# Patient Record
Sex: MALE | Race: WHITE | Employment: OTHER | URBAN - NONMETROPOLITAN AREA
[De-identification: names, ages, dates, MRNs, and addresses within clinical notes are randomized per-mention and may not be internally consistent; named-entity substitution may affect disease eponyms.]

---

## 2017-08-02 DIAGNOSIS — E78.2 MIXED HYPERLIPIDEMIA: ICD-10-CM

## 2017-08-02 NOTE — TELEPHONE ENCOUNTER
simvastatin (ZOCOR) 40 MG tablet  Last Written Prescription Date:6/9/16  Last Fill Quantity: 90, # refills: 3  Last Office Visit with [facility], [facility] or [facility] prescribing provider: 8/5/16  Next 5 appointments (look out 90 days)     Aug 18, 2017 10:20 AM CDT   PHYSICAL with Roland Kearns MD   [facility] ([facility])    [address] [facility] [id number] [phone]                   Lab Results   Component Value Date    CHOL 193 06/09/2016     Lab Results   Component Value Date    HDL 45 06/09/2016     Lab Results   Component Value Date     06/09/2016     Lab Results   Component Value Date    TRIG 124 06/09/2016     Lab Results   Component Value Date    CHOLHDLRATIO 3.5 04/01/2015

## 2017-08-03 RX ORDER — SIMVASTATIN 40 MG
TABLET ORAL
Qty: 90 TABLET | Refills: 0 | Status: SHIPPED | OUTPATIENT
Start: 2017-08-03 | End: 2017-11-27

## 2017-08-03 NOTE — TELEPHONE ENCOUNTER
Routing refill request to provider for review/approval because:  Labs out of range:  FLP  Labs not current:  SKIP Doll RN  [facility]

## 2017-08-18 ENCOUNTER — OFFICE VISIT (OUTPATIENT)
Dept: FAMILY MEDICINE | Facility: OTHER | Age: 67
End: 2017-08-18
Payer: MEDICARE

## 2017-08-18 VITALS
HEIGHT: 72 IN | TEMPERATURE: 97.5 F | OXYGEN SATURATION: 99 % | WEIGHT: 207.6 LBS | DIASTOLIC BLOOD PRESSURE: 80 MMHG | HEART RATE: 101 BPM | RESPIRATION RATE: 18 BRPM | BODY MASS INDEX: 28.12 KG/M2 | SYSTOLIC BLOOD PRESSURE: 102 MMHG

## 2017-08-18 DIAGNOSIS — E78.5 HYPERLIPIDEMIA LDL GOAL <130: ICD-10-CM

## 2017-08-18 DIAGNOSIS — Z00.00 MEDICARE ANNUAL WELLNESS VISIT, INITIAL: Primary | ICD-10-CM

## 2017-08-18 DIAGNOSIS — Z11.59 ENCOUNTER FOR HEPATITIS C SCREENING TEST FOR LOW RISK PATIENT: ICD-10-CM

## 2017-08-18 DIAGNOSIS — M72.2 PLANTAR FASCIITIS: ICD-10-CM

## 2017-08-18 PROCEDURE — G0402 INITIAL PREVENTIVE EXAM: HCPCS | Performed by: FAMILY MEDICINE

## 2017-08-18 ASSESSMENT — PAIN SCALES - GENERAL: PAINLEVEL: SEVERE PAIN (6)

## 2017-08-18 NOTE — MR AVS SNAPSHOT
After Visit Summary   8/18/2017    Dominic Silva    MRN: [id number]           Patient Information     Date Of Birth          1950        Visit Information        Provider Department      8/18/2017 10:20 AM Roland Kearns MD [facility]        Today's Diagnoses     Medicare annual wellness visit, initial    -  1    Hyperlipidemia LDL goal <130        Encounter for hepatitis C screening test for low risk patient        Plantar fasciitis          Care Instructions      Preventive Health Recommendations:       Male Ages 65 and over    Yearly exam:             See your health care provider every year in order to  o   Review health changes.   o   Discuss preventive care.    o   Review your medicines if your doctor has prescribed any.    Talk with your health care provider about whether you should have a test to screen for prostate cancer (PSA).    Every 3 years, have a diabetes test (fasting glucose). If you are at risk for diabetes, you should have this test more often.    Every 5 years, have a cholesterol test. Have this test more often if you are at risk for high cholesterol or heart disease.     Every 10 years, have a colonoscopy. Or, have a yearly FIT test (stool test). These exams will check for colon cancer.    Talk to with your health care provider about screening for Abdominal Aortic Aneurysm if you have a family history of AAA or have a history of smoking.  Shots:     Get a flu shot each year.     Get a tetanus shot every 10 years.     Talk to your doctor about your pneumonia vaccines. There are now two you should receive - Pneumovax (PPSV 23) and Prevnar (PCV 13).    Talk to your doctor about a shingles vaccine.     Talk to your doctor about the hepatitis B vaccine.  Nutrition:     Eat at least 5 servings of fruits and vegetables each day.     Eat whole-grain bread, whole-wheat pasta and brown rice instead of white grains and rice.     Talk to your doctor about Calcium and  Vitamin D.   Lifestyle    Exercise for at least 150 minutes a week (30 minutes a day, 5 days a week). This will help you control your weight and prevent disease.     Limit alcohol to one drink per day.     No smoking.     Wear sunscreen to prevent skin cancer.     See your dentist every six months for an exam and cleaning.   See your eye doctor every 1 to 2 years to screen for conditions such as glaucoma, macular degeneration and cataracts.  Plantar Fasciitis  Plantar fasciitis is a painful swelling of the plantar fascia. The plantar fascia is a thick, fibrous layer of tissue. It covers the bones on the bottom of your foot. And it supports the foot bones in an arched position.  This can happen gradually or suddenly. It usually affects one foot at a time. Heel pain can be sharp, like a knife sticking into the bottom of your foot. You may feel pain after exercising, long-distance jogging, stair climbing, long periods of standing, or after standing up.  Risk factors include: non-active lifestyle, arthritis, diabetes, obesity or recent weight gain, flat foot, high arch. Wearing high heels, loose shoes, or shoes with poor arch support for long periods of time adds to the risk. This problem is commonly found in runners and dancers. It also found in people who stand on hard surfaces for long periods of time.  Foot pain from this condition is usually worse in the morning. But it improves with walking. By the end of the day there may be a dull aching. Treatment requires short-term rest and controlling swelling. It may take up to 9 months before all symptoms go away. Rarely, a steroid injection into the foot, or surgery, may be needed.  Home care  If you are overweight, lose weight to help healing.  Choose supportive shoes with good arch support and shock absorbency. Replace athletic shoes when they become worn out. Don t walk or run barefoot.  Premade or custom-fitted shoe inserts may be helpful. Inserts made of silicone  seem to be the most effective. Custom-made inserts can be provided by a podiatrist or foot specialist, physical therapist, or orthopedist.  Premade or custom-made night splints keep the heel stretched out while you sleep. They may prevent morning pain.  Avoid activities that stress the feet: jogging, prolonged standing or walking, contact sports, etc.  First thing in the morning and before sports, stretch the bottom of your feet. Gently flex your ankle so the toes move toward your knee.  Icing may help control heel pain. Apply an ice pack to the heel for 10-20 minutes as a preventive. Or ice your heel after a severe flare-up of symptoms. You may repeat this every 1-2 hours as needed.  You may use over-the-counter pain medicine to control pain, unless another medicine was prescribed. Anti-inflammatory pain medicines, such as ibuprofen or naproxen, may work better than acetaminophen. If you have chronic liver or kidney disease or ever had a stomach ulcer or GI bleeding, talk with your healthcare provider before using these medicines.  Follow-up care  Follow up with your healthcare provider, physical therapist, or podiatrist or foot specialist as advised.  Call for an appointment if pain worsens or there is no relief after a few weeks of home treatment. Shoe inserts, a night splint, or a special boot may be required.  If X-rays were taken, you will be told of any new findings that may affect your care.  When to seek medical advice  Call your healthcare provider right away if any of these occur:  Foot swelling  Redness with increasing pain  Date Last Reviewed: 11/21/2015 2000-2017 [employer]. [address] [place] [address], [place] [address]. All rights reserved. This information is not intended as a substitute for professional medical care. Always follow your healthcare professional's instructions.        Treating Plantar Fasciitis  First, your healthcare provider tries to determine the cause of your problem  in order to suggest ways to relieve pain. If your pain is due to poor foot mechanics, custom-made shoe inserts (orthoses) may help.    Reduce symptoms  To relieve mild symptoms, try aspirin, ibuprofen, or other medicines as directed. Rubbing ice on the affected area may also help.  To reduce severe pain and swelling, your healthcare provider may prescribe pills or injections or a walking cast in some instances. Physical therapy, such as ultrasound or a daily stretching program, may also be recommended. Surgery is rarely required.  To reduce symptoms caused by poor foot mechanics, your foot may be taped. This supports the arch and temporarily controls movement. Night splints may also help by stretching the fascia.  Control movement  If taping helps, your healthcare provider may prescribe orthoses. Built from plaster casts of your feet, these inserts control the way your foot moves. As a result, your symptoms should go away.  Reduce overuse  Every time your foot strikes the ground, the plantar fascia is stretched. You can reduce the strain on the plantar fascia and the possibility of overuse by following these suggestions:  Lose any excess weight.  Avoid running on hard or uneven ground.  Use orthoses at all times in your shoes and house slippers.  If surgery is needed  Your healthcare provider may consider surgery if other types of treatment don't control your pain. During surgery, the plantar fascia is partially cut to release tension. As you heal, fibrous tissue fills the space between the heel bone and the plantar fascia.   Date Last Reviewed: 10/14/2015    3300-5955 [employer]. [address] [place] [address], [place] [address]. All rights reserved. This information is not intended as a substitute for professional medical care. Always follow your healthcare professional's instructions.        Understanding Plantar Fasciitis    Plantar fasciitis is a condition that causes foot and heel pain. The plantar  fascia is a tough band of tissue that runs across the bottom of the foot from the heel to the toes. This tissue pulls on the heel bone. It supports the arch of the foot as it pushes off the ground. If the tissue becomes irritated or red and swollen (inflamed), it is called plantar fasciitis.  How to say it  PLAN-tuhr fa-see-IY-tis   What causes plantar fasciitis?  Plantar fasciitis most often occurs from overusing the plantar fascia. The tissue may become damaged from activities that put repeated stress on the heel and foot. Or it may wear down over time with age and ankle stiffness. You are more likely to have plantar fasciitis if you:  Do activities that require a lot of running, jumping, or dancing  Have a job that requires being on your feet for long periods  Are overweight or obese  Have certain foot problems, such as a tight Achilles tendon, flat feet, or high arches  Often wear poorly fitting shoes  Symptoms of plantar fasciitis  The condition most often causes pain in the heel and the bottom of the foot. The pain may occur when you take your first steps in the morning. It may get better as you walk throughout the day. But as you continue to put weight on the foot, the pain often returns. Pain may also occur after standing or sitting for long periods.  Treating plantar fasciitis  Treatments for plantar fasciitis include:  Resting the foot. This involves limiting movements that make your foot hurt. You may also need to avoid certain sports and types of work for a time.  Using cold packs. Put an ice pack on the heel and foot to help reduce pain and swelling.  Taking pain medicines. Prescription and over-the-counter pain medicines can help relieve pain and swelling.  Using heel cups or foot inserts (orthotics). These are placed in the shoes to help support the heel or arch and cushion the heel. You may also be told to buy proper-fitting shoes with good arch support and cushioned soles.  Taping the foot. This  supports the arch and limits the movement of the plantar fascia to help relieve symptoms.  Wearing a night splint. This stretches the plantar fascia and leg muscles while you sleep. This may help relieve pain.  Doing exercises and physical therapy. These stretch and strengthen the plantar fascia and the muscles in the leg that support the heel and foot.  Getting shots of medicine into the foot. These may help relieve symptoms for a time.  Having surgery. This may be needed if other treatments fail to relieve symptoms. During surgery, the surgeon may partially cut the plantar fascia to release tension.  Possible complications of plantar fasciitis  Without proper care and treatment, healing may take longer than normal. Also, symptoms may continue or get worse. Over time, the plantar fascia may be damaged. This can make it hard to walk or even stand without pain.  When to call your healthcare provider  Call your healthcare provider right away if you have any of these:  Fever of 100.4 F (38 C) or higher, or as directed  Symptoms that don t get better with treatment, or get worse  New symptoms, such as numbness, tingling, or weakness in the foot   Date Last Reviewed: 3/10/2016    3593-3369 [employer]. [address] [place] [address]. All rights reserved. This information is not intended as a substitute for professional medical care. Always follow your healthcare professional's instructions.                  Follow-ups after your visit        Additional Services     PODIATRY/FOOT & ANKLE SURGERY REFERRAL       Your provider has referred you to: [facility]: [facility] [phone]   [email or web address]    Please be aware that coverage of these services is subject to the terms and limitations of your health insurance plan.  Call member services at your health plan with any benefit or coverage questions.      Please bring the following to your appointment:  >>    "Any x-rays, CTs or MRIs which have been performed.  Contact the facility where they were done to arrange for  prior to your scheduled appointment.    >>   List of current medications   >>   This referral request   >>   Any documents/labs given to you for this referral                  Future tests that were ordered for you today     Open Future Orders        Priority Expected Expires Ordered    Lipid panel reflex to direct LDL Routine  11/16/2017 8/18/2017    **Hepatitis C Screen Reflex to RNA FUTURE anytime Routine 8/18/2017 8/18/2018 8/18/2017            Who to contact     If you have questions or need follow up information about today's clinic visit or your schedule please contact [facility] directly at [phone].  Normal or non-critical lab and imaging results will be communicated to you by Mobile Experience[employer], letter or phone within 4 business days after the clinic has received the results. If you do not hear from us within 7 days, please contact the clinic through [employer] Experience[employer] or phone. If you have a critical or abnormal lab result, we will notify you by phone as soon as possible.  Submit refill requests through [employer] or call your pharmacy and they will forward the refill request to us. Please allow 3 business days for your refill to be completed.          Additional Information About Your Visit        [employer] Information     [employer] lets you send messages to your doctor, view your test results, renew your prescriptions, schedule appointments and more. To sign up, go to [email or web address] . Click on \"Log in\" on the left side of the screen, which will take you to the Welcome page. Then click on \"Sign up Now\" on the right side of the page.     You will be asked to enter the access code listed below, as well as some personal information. Please follow the directions to create your username and password.     Your access code is: [email or web address]  Expires: 11/16/2017 11:22 AM     Your access code will " " in 90 days. If you need help or a new code, please call your [facility] clinic or [phone].        Care EveryWhere ID     This is your Care EveryWhere ID. This could be used by other organizations to access your [facility] medical records  [id number]        Your Vitals Were     Pulse Temperature Respirations Height Pulse Oximetry BMI (Body Mass Index)    101 97.5  F (36.4  C) (Temporal) 18 5' 11.6\" (1.819 m) 99% 28.47 kg/m2       Blood Pressure from Last 3 Encounters:   17 102/80   16 112/64   16 116/74    Weight from Last 3 Encounters:   17 207 lb 9.6 oz (94.2 kg)   16 203 lb 8 oz (92.3 kg)   16 200 lb (90.7 kg)              We Performed the Following     PODIATRY/FOOT & ANKLE SURGERY REFERRAL        Primary Care Provider Office Phone # Fax #    Roland Kearns [phone]       [address] [facility] [address]        Equal Access to Services     [facility] Hospital[facility] : Hadii cynthia Lopez, wadexterda brayden, qaybta arlene mendez, daniele grvoes. So [facility] [phone].    ATENCIÓN: Si habla español, tiene a crockett disposición servicios gratuitos de asistencia lingüística. LlL[facility] [phone].    We comply with applicable federal civil rights laws and [place] laws. We do not discriminate on the basis of race, color, national origin, age, disability sex, sexual orientation or gender identity.            Thank you!     Thank you for choosing [facility]  for your care. Our goal is always to provide you with excellent care. Hearing back from our patients is one way we can continue to improve our services. Please take a few minutes to complete the written survey that you may receive in the mail after your visit with us. Thank you!             Your Updated Medication List - Protect others around you: Learn how to safely use, store and throw away your medicines at www.disposemymeds.org.          This list is accurate as " of: 8/18/17 11:22 AM.  Always use your most recent med list.                   Brand Name Dispense Instructions for use Diagnosis    simvastatin 40 MG tablet    ZOCOR    90 tablet    TAKE 1 TABLET BY MOUTH DAILY AT BEDTIME    Mixed hyperlipidemia

## 2017-08-18 NOTE — PROGRESS NOTES
SUBJECTIVE:   Dominic Silva is a 67 year old male who presents for Preventive Visit.  Are you in the first 12 months of your Medicare Part B coverage?  Yes,  Visual Acuity:  Right Eye: 10/40   Left Eye: 10/40  Both Eyes: 20/60 with out wearing glasses    Healthy Habits:    Do you get at least three servings of calcium containing foods daily (dairy, green leafy vegetables, etc.)? yes    Amount of exercise or daily activities, outside of work: 7 day(s) per week    Problems taking medications regularly No    Medication side effects: No    Have you had an eye exam in the past two years? no    Do you see a dentist twice per year? yes    Do you have sleep apnea, excessive snoring or daytime drowsiness?yes    COGNITIVE SCREEN  1) Repeat 3 items (Banana, Sunrise, Chair)  2) Clock draw: NORMAL  3) 3 item recall: Recalls 2 objects   Results: NORMAL clock, 1-2 items recalled: COGNITIVE IMPAIRMENT LESS LIKELY    Mini-CogTM Copyright S [employer]. Licensed by the author for use in [facility]; reprinted with permission (stacy@[facility]). All rights reserved.                Hyperlipidemia Follow-Up      Rate your low fat/cholesterol diet?: good    Taking statin?  Yes, no muscle aches from statin    Other lipid medications/supplements?:  none          Reviewed and updated as needed this visit by clinical staffTobacco  Allergies  Meds  Problems  Med Hx  Surg Hx  Fam Hx  Soc Hx          Reviewed and updated as needed this visit by Provider  Allergies  Meds  Problems        Social History   Substance Use Topics     Smoking status: Never Smoker     Smokeless tobacco: Never Used     Alcohol use Yes      Comment: occasional, twice a month       The patient does not drink >3 drinks per day nor >7 drinks per week.    Today's PHQ-2 Score:   PHQ-2 ( 1999 [employer]) 8/18/2017 6/9/2016   Q1: Little interest or pleasure in doing things 0 0   Q2: Feeling down, depressed or hopeless 0 0   PHQ-2 Score 0 0         Do you feel safe  in your environment - Yes    Do you have a Health Care Directive?: No: Advance care planning was reviewed with patient; patient declined at this time.      Current providers sharing in care for this patient include: Patient Care Team:  Roland Kearns MD as PCP - General      Hearing impairment: No    Ability to successfully perform activities of daily living: Yes, no assistance needed     Fall risk:  Fallen 2 or more times in the past year?: No  Any fall with injury in the past year?: No      Home safety:  none identified      The following health maintenance items are reviewed in Epic and correct as of today:  Health Maintenance   Topic Date Due     HEPATITIS C SCREENING  04/01/1970     FALL RISK ASSESSMENT  04/01/2017     PNEUMOCOCCAL (1 of 2 - PCV13) 04/01/2017     LIPID MONITORING Q1 YEAR  06/09/2017     ADVANCE DIRECTIVE PLANNING Q5 YRS  06/13/2017     INFLUENZA VACCINE (SYSTEM ASSIGNED)  09/01/2017     TETANUS IMMUNIZATION (SYSTEM ASSIGNED)  04/29/2019     COLONOSCOPY Q10 YR  04/13/2025     AORTIC ANEURYSM SCREENING (SYSTEM ASSIGNED)  Completed     Labs reviewed in EPIC  BP Readings from Last 3 Encounters:   08/18/17 102/80   08/05/16 112/64   07/07/16 116/74    Wt Readings from Last 3 Encounters:   08/18/17 207 lb 9.6 oz (94.2 kg)   08/05/16 203 lb 8 oz (92.3 kg)   07/07/16 200 lb (90.7 kg)                  Patient Active Problem List   Diagnosis     HYPERLIPIDEMIA LDL GOAL <130     Advanced directives, counseling/discussion     PMR (polymyalgia rheumatica) (H)     Acquired elevated diaphragm     Cervical spondylosis without myelopathy     Past Surgical History:   Procedure Laterality Date     COLONOSCOPY N/A 4/13/2015    Procedure: COLONOSCOPY;  Surgeon: Ajay Amador MD;  Location: [facility]     HC AMPUTATE FINGER/THUMB,SINGLE W/WO INTER TFR      2 fingers     HC COLONOSCOPY THRU STOMA, DIAGNOSTIC  2005    Normal-repeat 2015       Social History   Substance Use Topics     Smoking status: Never Smoker      Smokeless tobacco: Never Used     Alcohol use Yes      Comment: occasional, twice a month     Family History   Problem Relation Age of Onset     Cardiovascular Father      pacemaker, CABG     Respiratory Father      GASTROINTESTINAL DISEASE Father      ulcers several years ago     Alzheimer Disease Mother      undiagnosed--has symptoms     Lipids Mother      Eye Disorder Mother      cataract     Lipids Brother      Unknown/Adopted Brother      Unknown/Adopted Brother      HEART DISEASE Sister      pacemaker     C.A.D. Brother      bypass         Current Outpatient Prescriptions   Medication Sig Dispense Refill     simvastatin (ZOCOR) 40 MG tablet TAKE 1 TABLET BY MOUTH DAILY AT BEDTIME 90 tablet 0     Allergies   Allergen Reactions     No Known Drug Allergies      Recent Labs   Lab Test  08/05/16   1335  06/09/16   0809  04/01/15   0918  04/17/14   1044  04/17/13   1642  12/17/12   1412   06/13/12   0928  05/16/11   0918   LDL   --   123*  114  85   --    --    < >  105  116   HDL   --   45  54   --    --    --    --   42  45   TRIG   --   124  99   --    --    --    --   163*  105   ALT  28   --    --    --    --   40   --    --   34   CR   --    --   0.87   --   0.82  0.84   --   0.75  0.82   GFRESTIMATED   --    --   89   --   >90  >90   --   >90  >90   GFRESTBLACK   --    --   >90   GFR Calc     --   >90  >90   --   >90  >90   POTASSIUM   --    --   3.9   --   4.9  4.6   --   4.3  4.5   TSH  1.81   --    --    --   0.83   --    --    --    --     < > = values in this interval not displayed.              Pneumonia Vaccine:Adults age 65+ who have not received previous Pneumovax (PPSV23) or PCV13 as an adult: Should first be given PCV13 AND then should be given PPSV23 6-12 months after PCV13    ROS:  C: NEGATIVE for fever, chills, change in weight  E/M: NEGATIVE for ear, mouth and throat problems  R: NEGATIVE for significant cough or SOB  CV: NEGATIVE for chest pain, palpitations or peripheral  "edema  MUSCULOSKELETAL: POSITIVE  for plantar fasciitis.  ROS otherwise negative    OBJECTIVE:   /80 (BP Location: Left arm, Patient Position: Chair, Cuff Size: Adult Large)  Pulse 101  Temp 97.5  F (36.4  C) (Temporal)  Resp 18  Ht 5' 11.6\" (1.819 m)  Wt 207 lb 9.6 oz (94.2 kg)  SpO2 99%  BMI 28.47 kg/m2 Estimated body mass index is 28.47 kg/(m^2) as calculated from the following:    Height as of this encounter: 5' 11.6\" (1.819 m).    Weight as of this encounter: 207 lb 9.6 oz (94.2 kg).  EXAM:   GENERAL: healthy, alert and no distress  EYES: Eyes grossly normal to inspection, PERRL and conjunctivae and sclerae normal  HENT: ear canals and TM's normal, nose and mouth without ulcers or lesions  NECK: no adenopathy, no asymmetry, masses, or scars and thyroid normal to palpation  RESP: lungs clear to auscultation - no rales, rhonchi or wheezes  CV: regular rate and rhythm, normal S1 S2, no S3 or S4, no murmur, click or rub, no peripheral edema and peripheral pulses strong  ABDOMEN: soft, nontender, no hepatosplenomegaly, no masses and bowel sounds normal  MS: no gross musculoskeletal defects noted, no edema  SKIN: no suspicious lesions or rashes  NEURO: Normal strength and tone, mentation intact and speech normal  PSYCH: mentation appears normal, affect normal/bright  Diabetic foot exam: normal DP and PT pulses, no trophic changes or ulcerative lesions, normal sensory exam and FEET: tenderness to the inferomedial aspect of the effected heel(s)at the insertion of the plantar fascia.      ASSESSMENT / PLAN:       ICD-10-CM    1. Medicare annual wellness visit, initial Z00.00    2. Hyperlipidemia LDL goal <130 E78.5 Lipid panel reflex to direct LDL   3. Encounter for hepatitis C screening test for low risk patient Z11.59 **Hepatitis C Screen Reflex to RNA FUTURE anytime   4. Plantar fasciitis M72.2 PODIATRY/FOOT & ANKLE SURGERY REFERRAL       End of Life Planning:  Patient currently has an advanced " "directive: Yes.  Practitioner is supportive of decision.    COUNSELING:  Reviewed preventive health counseling, as reflected in patient instructions       Regular exercise       Healthy diet/nutrition       Vision screening       Immunizations    Vaccinate for: Pneumococcal  At pharmacy         Aspirin Prophylaxsis       Alcohol Use       Hepatitis C screening       The 10-year ASCVD risk score (Aleta PERDOMO Jr, et al., 2013) is: 9%    Values used to calculate the score:      Age: 67 years      Sex: Male      Is Non- : No      Diabetic: No      Tobacco smoker: No      Systolic Blood Pressure: 102 mmHg      Is BP treated: No      HDL Cholesterol: 45 mg/dL      Total Cholesterol: 193 mg/dL          Estimated body mass index is 28.47 kg/(m^2) as calculated from the following:    Height as of this encounter: 5' 11.6\" (1.819 m).    Weight as of this encounter: 207 lb 9.6 oz (94.2 kg).     reports that he has never smoked. He has never used smokeless tobacco.        Appropriate preventive services were discussed with this patient, including applicable screening as appropriate for cardiovascular disease, diabetes, osteopenia/osteoporosis, and glaucoma.  As appropriate for age/gender, discussed screening for colorectal cancer, prostate cancer, breast cancer, and cervical cancer. Checklist reviewing preventive services available has been given to the patient.    Reviewed patients plan of care and provided an AVS. The Basic Care Plan (routine screening as documented in Health Maintenance) for Dominic meets the Care Plan requirement. This Care Plan has been established and reviewed with the Patient.    Counseling Resources:  ATP IV Guidelines  Pooled Cohorts Equation Calculator  Breast Cancer Risk Calculator  FRAX Risk Assessment  ICSI Preventive Guidelines  Dietary Guidelines for Americans, 2010  USDA's MyPlate  ASA Prophylaxis  Lung CA Screening    Roland Kearns MD  [facility]  "

## 2017-08-18 NOTE — PATIENT INSTRUCTIONS
Preventive Health Recommendations:       Male Ages 65 and over    Yearly exam:             See your health care provider every year in order to  o   Review health changes.   o   Discuss preventive care.    o   Review your medicines if your doctor has prescribed any.    Talk with your health care provider about whether you should have a test to screen for prostate cancer (PSA).    Every 3 years, have a diabetes test (fasting glucose). If you are at risk for diabetes, you should have this test more often.    Every 5 years, have a cholesterol test. Have this test more often if you are at risk for high cholesterol or heart disease.     Every 10 years, have a colonoscopy. Or, have a yearly FIT test (stool test). These exams will check for colon cancer.    Talk to with your health care provider about screening for Abdominal Aortic Aneurysm if you have a family history of AAA or have a history of smoking.  Shots:     Get a flu shot each year.     Get a tetanus shot every 10 years.     Talk to your doctor about your pneumonia vaccines. There are now two you should receive - Pneumovax (PPSV 23) and Prevnar (PCV 13).    Talk to your doctor about a shingles vaccine.     Talk to your doctor about the hepatitis B vaccine.  Nutrition:     Eat at least 5 servings of fruits and vegetables each day.     Eat whole-grain bread, whole-wheat pasta and brown rice instead of white grains and rice.     Talk to your doctor about Calcium and Vitamin D.   Lifestyle    Exercise for at least 150 minutes a week (30 minutes a day, 5 days a week). This will help you control your weight and prevent disease.     Limit alcohol to one drink per day.     No smoking.     Wear sunscreen to prevent skin cancer.     See your dentist every six months for an exam and cleaning.   See your eye doctor every 1 to 2 years to screen for conditions such as glaucoma, macular degeneration and cataracts.  Plantar Fasciitis  Plantar fasciitis is a painful swelling of  the plantar fascia. The plantar fascia is a thick, fibrous layer of tissue. It covers the bones on the bottom of your foot. And it supports the foot bones in an arched position.  This can happen gradually or suddenly. It usually affects one foot at a time. Heel pain can be sharp, like a knife sticking into the bottom of your foot. You may feel pain after exercising, long-distance jogging, stair climbing, long periods of standing, or after standing up.  Risk factors include: non-active lifestyle, arthritis, diabetes, obesity or recent weight gain, flat foot, high arch. Wearing high heels, loose shoes, or shoes with poor arch support for long periods of time adds to the risk. This problem is commonly found in runners and dancers. It also found in people who stand on hard surfaces for long periods of time.  Foot pain from this condition is usually worse in the morning. But it improves with walking. By the end of the day there may be a dull aching. Treatment requires short-term rest and controlling swelling. It may take up to 9 months before all symptoms go away. Rarely, a steroid injection into the foot, or surgery, may be needed.  Home care  If you are overweight, lose weight to help healing.  Choose supportive shoes with good arch support and shock absorbency. Replace athletic shoes when they become worn out. Don t walk or run barefoot.  Premade or custom-fitted shoe inserts may be helpful. Inserts made of silicone seem to be the most effective. Custom-made inserts can be provided by a podiatrist or foot specialist, physical therapist, or orthopedist.  Premade or custom-made night splints keep the heel stretched out while you sleep. They may prevent morning pain.  Avoid activities that stress the feet: jogging, prolonged standing or walking, contact sports, etc.  First thing in the morning and before sports, stretch the bottom of your feet. Gently flex your ankle so the toes move toward your knee.  Icing may help  control heel pain. Apply an ice pack to the heel for 10-20 minutes as a preventive. Or ice your heel after a severe flare-up of symptoms. You may repeat this every 1-2 hours as needed.  You may use over-the-counter pain medicine to control pain, unless another medicine was prescribed. Anti-inflammatory pain medicines, such as ibuprofen or naproxen, may work better than acetaminophen. If you have chronic liver or kidney disease or ever had a stomach ulcer or GI bleeding, talk with your healthcare provider before using these medicines.  Follow-up care  Follow up with your healthcare provider, physical therapist, or podiatrist or foot specialist as advised.  Call for an appointment if pain worsens or there is no relief after a few weeks of home treatment. Shoe inserts, a night splint, or a special boot may be required.  If X-rays were taken, you will be told of any new findings that may affect your care.  When to seek medical advice  Call your healthcare provider right away if any of these occur:  Foot swelling  Redness with increasing pain  Date Last Reviewed: 11/21/2015 2000-2017 [employer]. [address] [place] [address]. All rights reserved. This information is not intended as a substitute for professional medical care. Always follow your healthcare professional's instructions.        Treating Plantar Fasciitis  First, your healthcare provider tries to determine the cause of your problem in order to suggest ways to relieve pain. If your pain is due to poor foot mechanics, custom-made shoe inserts (orthoses) may help.    Reduce symptoms  To relieve mild symptoms, try aspirin, ibuprofen, or other medicines as directed. Rubbing ice on the affected area may also help.  To reduce severe pain and swelling, your healthcare provider may prescribe pills or injections or a walking cast in some instances. Physical therapy, such as ultrasound or a daily stretching program, may also be recommended.  Surgery is rarely required.  To reduce symptoms caused by poor foot mechanics, your foot may be taped. This supports the arch and temporarily controls movement. Night splints may also help by stretching the fascia.  Control movement  If taping helps, your healthcare provider may prescribe orthoses. Built from plaster casts of your feet, these inserts control the way your foot moves. As a result, your symptoms should go away.  Reduce overuse  Every time your foot strikes the ground, the plantar fascia is stretched. You can reduce the strain on the plantar fascia and the possibility of overuse by following these suggestions:  Lose any excess weight.  Avoid running on hard or uneven ground.  Use orthoses at all times in your shoes and house slippers.  If surgery is needed  Your healthcare provider may consider surgery if other types of treatment don't control your pain. During surgery, the plantar fascia is partially cut to release tension. As you heal, fibrous tissue fills the space between the heel bone and the plantar fascia.   Date Last Reviewed: 10/14/2015    7931-5376 [employer]. [address] [place] [address]. All rights reserved. This information is not intended as a substitute for professional medical care. Always follow your healthcare professional's instructions.        Understanding Plantar Fasciitis    Plantar fasciitis is a condition that causes foot and heel pain. The plantar fascia is a tough band of tissue that runs across the bottom of the foot from the heel to the toes. This tissue pulls on the heel bone. It supports the arch of the foot as it pushes off the ground. If the tissue becomes irritated or red and swollen (inflamed), it is called plantar fasciitis.  How to say it  PLAN-tuhr [id number]-IY-[id number]   What causes plantar fasciitis?  Plantar fasciitis most often occurs from overusing the plantar fascia. The tissue may become damaged from activities that put repeated stress on  the heel and foot. Or it may wear down over time with age and ankle stiffness. You are more likely to have plantar fasciitis if you:  Do activities that require a lot of running, jumping, or dancing  Have a job that requires being on your feet for long periods  Are overweight or obese  Have certain foot problems, such as a tight Achilles tendon, flat feet, or high arches  Often wear poorly fitting shoes  Symptoms of plantar fasciitis  The condition most often causes pain in the heel and the bottom of the foot. The pain may occur when you take your first steps in the morning. It may get better as you walk throughout the day. But as you continue to put weight on the foot, the pain often returns. Pain may also occur after standing or sitting for long periods.  Treating plantar fasciitis  Treatments for plantar fasciitis include:  Resting the foot. This involves limiting movements that make your foot hurt. You may also need to avoid certain sports and types of work for a time.  Using cold packs. Put an ice pack on the heel and foot to help reduce pain and swelling.  Taking pain medicines. Prescription and over-the-counter pain medicines can help relieve pain and swelling.  Using heel cups or foot inserts (orthotics). These are placed in the shoes to help support the heel or arch and cushion the heel. You may also be told to buy proper-fitting shoes with good arch support and cushioned soles.  Taping the foot. This supports the arch and limits the movement of the plantar fascia to help relieve symptoms.  Wearing a night splint. This stretches the plantar fascia and leg muscles while you sleep. This may help relieve pain.  Doing exercises and physical therapy. These stretch and strengthen the plantar fascia and the muscles in the leg that support the heel and foot.  Getting shots of medicine into the foot. These may help relieve symptoms for a time.  Having surgery. This may be needed if other treatments fail to relieve  symptoms. During surgery, the surgeon may partially cut the plantar fascia to release tension.  Possible complications of plantar fasciitis  Without proper care and treatment, healing may take longer than normal. Also, symptoms may continue or get worse. Over time, the plantar fascia may be damaged. This can make it hard to walk or even stand without pain.  When to call your healthcare provider  Call your healthcare provider right away if you have any of these:  Fever of 100.4 F (38 C) or higher, or as directed  Symptoms that don t get better with treatment, or get worse  New symptoms, such as numbness, tingling, or weakness in the foot   Date Last Reviewed: 3/10/2016    7343-1226 [employer]. [address] [place] [address], [place] [address]. All rights reserved. This information is not intended as a substitute for professional medical care. Always follow your healthcare professional's instructions.

## 2017-09-13 DIAGNOSIS — E78.5 HYPERLIPIDEMIA LDL GOAL <130: ICD-10-CM

## 2017-09-13 DIAGNOSIS — Z11.59 ENCOUNTER FOR HEPATITIS C SCREENING TEST FOR LOW RISK PATIENT: ICD-10-CM

## 2017-09-13 LAB
CHOLEST SERPL-MCNC: 218 MG/DL
HCV AB SERPL QL IA: NONREACTIVE
HDLC SERPL-MCNC: 47 MG/DL
LDLC SERPL CALC-MCNC: 145 MG/DL
NONHDLC SERPL-MCNC: 171 MG/DL
TRIGL SERPL-MCNC: 128 MG/DL

## 2017-09-13 PROCEDURE — G0472 HEP C SCREEN HIGH RISK/OTHER: HCPCS | Performed by: FAMILY MEDICINE

## 2017-09-13 PROCEDURE — 80061 LIPID PANEL: CPT | Performed by: FAMILY MEDICINE

## 2017-09-13 PROCEDURE — 36415 COLL VENOUS BLD VENIPUNCTURE: CPT | Performed by: FAMILY MEDICINE

## 2017-11-27 DIAGNOSIS — E78.2 MIXED HYPERLIPIDEMIA: ICD-10-CM

## 2017-11-29 RX ORDER — SIMVASTATIN 40 MG
40 TABLET ORAL AT BEDTIME
Qty: 90 TABLET | Refills: 1 | Status: SHIPPED | OUTPATIENT
Start: 2017-11-29 | End: 2017-12-08

## 2017-11-29 NOTE — TELEPHONE ENCOUNTER
Requested Prescriptions   Pending Prescriptions Disp Refills     simvastatin (ZOCOR) 40 MG tablet [Pharmacy Med Name: SIMVASTATIN 40MG TAB] 90 tablet      Sig: TAKE 1 TABLET BY MOUTH DAILY AT BEDTIME MUST MAKE APPT. BEFORE ANY ADDITIONAL REFILLS    Statins Protocol Passed    11/27/2017  1:03 PM       Passed - LDL on file in past 12 months    Recent Labs   Lab Test  09/13/17   0858   LDL  145*            Passed - No abnormal creatine kinase in past 12 months    No lab results found.         Passed - Recent or future visit with authorizing provider    Patient had office visit in the last year or has a visit in the next 30 days with authorizing provider.  See chart review.              Passed - Patient is age 18 or older

## 2017-11-29 NOTE — TELEPHONE ENCOUNTER
Routing refill request to provider for review/approval because:  Labs out of range:  LDL    Kaylan Doll, RN  [facility]

## 2017-12-08 ENCOUNTER — OFFICE VISIT (OUTPATIENT)
Dept: FAMILY MEDICINE | Facility: OTHER | Age: 67
End: 2017-12-08
Payer: MEDICARE

## 2017-12-08 VITALS
TEMPERATURE: 98.1 F | WEIGHT: 214.5 LBS | SYSTOLIC BLOOD PRESSURE: 120 MMHG | DIASTOLIC BLOOD PRESSURE: 76 MMHG | BODY MASS INDEX: 29.42 KG/M2 | HEART RATE: 98 BPM | OXYGEN SATURATION: 98 % | RESPIRATION RATE: 18 BRPM

## 2017-12-08 DIAGNOSIS — M19.91 PRIMARY OSTEOARTHRITIS, UNSPECIFIED SITE: Primary | ICD-10-CM

## 2017-12-08 DIAGNOSIS — E78.2 MIXED HYPERLIPIDEMIA: ICD-10-CM

## 2017-12-08 PROCEDURE — 99213 OFFICE O/P EST LOW 20 MIN: CPT | Performed by: FAMILY MEDICINE

## 2017-12-08 RX ORDER — SIMVASTATIN 40 MG
40 TABLET ORAL AT BEDTIME
Qty: 90 TABLET | Refills: 3 | Status: SHIPPED | OUTPATIENT
Start: 2017-12-08 | End: 2019-01-24

## 2017-12-08 ASSESSMENT — PAIN SCALES - GENERAL: PAINLEVEL: SEVERE PAIN (6)

## 2017-12-08 NOTE — MR AVS SNAPSHOT
"              After Visit Summary   2017    Dominic Silva    MRN: [id number]           Patient Information     Date Of Birth          1950        Visit Information        Provider Department      2017 10:00 AM Roland Kearns MD [facility]        Today's Diagnoses     Mixed hyperlipidemia           Follow-ups after your visit        Follow-up notes from your care team     Return in about 1 year (around 2018) for Routine Visit.      Who to contact     If you have questions or need follow up information about today's clinic visit or your schedule please contact [facility] directly at [phone].  Normal or non-critical lab and imaging results will be communicated to you by [email or web address], letter or phone within 4 business days after the clinic has received the results. If you do not hear from us within 7 days, please contact the clinic through [email or web address] or phone. If you have a critical or abnormal lab result, we will notify you by phone as soon as possible.  Submit refill requests through FlexScore or call your pharmacy and they will forward the refill request to us. Please allow 3 business days for your refill to be completed.          Additional Information About Your Visit        MyChart Information     FlexScore lets you send messages to your doctor, view your test results, renew your prescriptions, schedule appointments and more. To sign up, go to [email or web address] . Click on \"Log in\" on the left side of the screen, which will take you to the Welcome page. Then click on \"Sign up Now\" on the right side of the page.     You will be asked to enter the access code listed below, as well as some personal information. Please follow the directions to create your username and password.     Your access code is: [email or web address]  Expires: 3/8/2018 10:56 AM     Your access code will  in 90 days. If you need help or a new code, please call your [facility] or [phone].      "   Care EveryWhere ID     This is your Care EveryWhere ID. This could be used by other organizations to access your [facility] medical records  [id number]        Your Vitals Were     Pulse Temperature Respirations Pulse Oximetry BMI (Body Mass Index)       98 98.1  F (36.7  C) (Temporal) 18 98% 29.42 kg/m2        Blood Pressure from Last 3 Encounters:   12/08/17 120/76   08/18/17 102/80   08/05/16 112/64    Weight from Last 3 Encounters:   12/08/17 214 lb 8 oz (97.3 kg)   08/18/17 207 lb 9.6 oz (94.2 kg)   08/05/16 203 lb 8 oz (92.3 kg)              Today, you had the following     No orders found for display         Where to get your medicines      These medications were sent to [id number] - [place], [place] - [address] [place] [address]  [address] [place] [id number]    Hours:  M-F 8:30-6:30; Sat 9-4; closed Sunday Phone:  [phone]     simvastatin 40 MG tablet          Primary Care Provider Office Phone # Fax #    Roland Kearns [phone]       [address] [facility] [address]        Equal Access to Services     ASIA LUX AH: Hadii aad ku hadasho Sogeorgianaali, waaxda luqadaha, qaybta kaalmada adeegyada, daniele groves. So [facility] [phone].    ATENCIÓN: Si habla español, tiene a crockett disposición servicios gratuitos de asistencia lingüística. Llame al [phone].    We comply with applicable federal civil rights laws and [place] laws. We do not discriminate on the basis of race, color, national origin, age, disability, sex, sexual orientation, or gender identity.            Thank you!     Thank you for choosing [facility]  for your care. Our goal is always to provide you with excellent care. Hearing back from our patients is one way we can continue to improve our services. Please take a few minutes to complete the written survey that you may receive in the mail after your visit with us. Thank you!             Your Updated Medication List - Protect others  around you: Learn how to safely use, store and throw away your medicines at www.disposemymeds.org.          This list is accurate as of: 12/8/17 10:56 AM.  Always use your most recent med list.                   Brand Name Dispense Instructions for use Diagnosis    ASPIRIN PO           IBUPROFEN PO      Take 200 mg by mouth        simvastatin 40 MG tablet    ZOCOR    90 tablet    Take 1 tablet (40 mg) by mouth At Bedtime    Mixed hyperlipidemia

## 2017-12-08 NOTE — NURSING NOTE
"Chief Complaint   Patient presents with     Recheck Medication       Initial /76 (BP Location: Right arm, Patient Position: Chair, Cuff Size: Adult Large)  Pulse 98  Temp 98.1  F (36.7  C) (Temporal)  Resp 18  Wt 214 lb 8 oz (97.3 kg)  SpO2 98%  BMI 29.42 kg/m2 Estimated body mass index is 29.42 kg/(m^2) as calculated from the following:    Height as of 8/18/17: 5' 11.6\" (1.819 m).    Weight as of this encounter: 214 lb 8 oz (97.3 kg).  Medication Reconciliation: complete     Prema Stephenson MA 12/8/2017  10:22 AM          "

## 2017-12-08 NOTE — PROGRESS NOTES
SUBJECTIVE:   Dominic Silva is a 67 year old male who presents to clinic today for the following health issues:        Hyperlipidemia Follow-Up      Rate your low fat/cholesterol diet?: good    Taking statin?  Yes, no muscle aches from statin    Other lipid medications/supplements?:  none        Amount of exercise or physical activity: 6-7 days/week for an average of greater than 60 minutes    Problems taking medications regularly: No    Medication side effects: none    Diet: regular (no restrictions) and low fat/cholesterol    Concern: Been having hand and foot pain.   He has history of PMR but this pain is intermittent due to OA.    Problem list and histories reviewed & adjusted, as indicated.  Additional history: as documented    Patient Active Problem List   Diagnosis     HYPERLIPIDEMIA LDL GOAL <130     Advanced directives, counseling/discussion     PMR (polymyalgia rheumatica) (H)     Acquired elevated diaphragm     Cervical spondylosis without myelopathy     Past Surgical History:   Procedure Laterality Date     COLONOSCOPY N/A 4/13/2015    Procedure: COLONOSCOPY;  Surgeon: Ajay Amador MD;  Location: [facility]     HC AMPUTATE FINGER/THUMB,SINGLE W/WO INTER TFR      2 fingers     HC COLONOSCOPY THRU STOMA, DIAGNOSTIC  2005    Normal-repeat 2015       Social History   Substance Use Topics     Smoking status: Never Smoker     Smokeless tobacco: Never Used     Alcohol use Yes      Comment: occasional, twice a month     Family History   Problem Relation Age of Onset     Cardiovascular Father      pacemaker, CABG     Respiratory Father      GASTROINTESTINAL DISEASE Father      ulcers several years ago     Alzheimer Disease Mother      undiagnosed--has symptoms     Lipids Mother      Eye Disorder Mother      cataract     Lipids Brother      Unknown/Adopted Brother      Unknown/Adopted Brother      HEART DISEASE Sister      pacemaker     C.A.D. Brother      bypass         Current Outpatient Prescriptions    Medication Sig Dispense Refill     IBUPROFEN PO Take 200 mg by mouth       ASPIRIN PO        simvastatin (ZOCOR) 40 MG tablet Take 1 tablet (40 mg) by mouth At Bedtime 90 tablet 1     Allergies   Allergen Reactions     No Known Drug Allergies      Recent Labs   Lab Test  09/13/17   0858  08/05/16   1335  06/09/16   0809  04/01/15   0918   04/17/13   1642  12/17/12   1412   05/16/11   0918   LDL  145*   --   123*  114   < >   --    --    < >  116   HDL  47   --   45  54   --    --    --    < >  45   TRIG  128   --   124  99   --    --    --    < >  105   ALT   --   28   --    --    --    --   40   --   34   CR   --    --    --   0.87   --   0.82  0.84   < >  0.82   GFRESTIMATED   --    --    --   89   --   >90  >90   < >  >90   GFRESTBLACK   --    --    --   >90   GFR Calc     --   >90  >90   < >  >90   POTASSIUM   --    --    --   3.9   --   4.9  4.6   < >  4.5   TSH   --   1.81   --    --    --   0.83   --    --    --     < > = values in this interval not displayed.      BP Readings from Last 3 Encounters:   12/08/17 120/76   08/18/17 102/80   08/05/16 112/64    Wt Readings from Last 3 Encounters:   12/08/17 214 lb 8 oz (97.3 kg)   08/18/17 207 lb 9.6 oz (94.2 kg)   08/05/16 203 lb 8 oz (92.3 kg)                          Reviewed and updated as needed this visit by clinical staffTobacco  Allergies  Meds  Problems  Med Hx  Surg Hx  Fam Hx  Soc Hx        Reviewed and updated as needed this visit by Provider  Allergies  Meds  Problems         ROS:  Constitutional, HEENT, cardiovascular, pulmonary, gi and gu systems are negative, except as otherwise noted.      OBJECTIVE:   /76 (BP Location: Right arm, Patient Position: Chair, Cuff Size: Adult Large)  Pulse 98  Temp 98.1  F (36.7  C) (Temporal)  Resp 18  Wt 214 lb 8 oz (97.3 kg)  SpO2 98%  BMI 29.42 kg/m2  Body mass index is 29.42 kg/(m^2).  GENERAL: healthy, alert and no distress  NECK: no adenopathy, no asymmetry, masses, or  scars and thyroid normal to palpation  RESP: lungs clear to auscultation - no rales, rhonchi or wheezes  CV: regular rate and rhythm, normal S1 S2, no S3 or S4, no murmur, click or rub, no peripheral edema and peripheral pulses strong  ABDOMEN: soft, nontender, no hepatosplenomegaly, no masses and bowel sounds normal  MS: JOINTS: thickened joints at base of thumb with mildly reduced ROM. Finkelstein negative. Exam for Carpal Tunnel syndrome shows both sides negative - Tinel and Phalen tests are negative, normal sensation, no atrophy.  Diabetic foot exam: normal DP and PT pulses, no trophic changes or ulcerative lesions and normal sensory exam    Diagnostic Test Results:  Results for orders placed or performed in visit on 09/13/17   Lipid panel reflex to direct LDL   Result Value Ref Range    Cholesterol 218 (H) <200 mg/dL    Triglycerides 128 <150 mg/dL    HDL Cholesterol 47 >39 mg/dL    LDL Cholesterol Calculated 145 (H) <100 mg/dL    Non HDL Cholesterol 171 (H) <130 mg/dL   **Hepatitis C Screen Reflex to RNA FUTURE anytime   Result Value Ref Range    Hepatitis C Antibody Nonreactive NR^Nonreactive       ASSESSMENT/PLAN:     Hyperlipidemia; controlled   Plan:  No changes in the patient's current treatment plan    - simvastatin (ZOCOR) 40 MG tablet; Take 1 tablet (40 mg) by mouth At Bedtime  Dispense: 90 tablet; Refill: 3    2. Primary osteoarthritis, unspecified site  Involving hands and feet. Continue symptomatic relief to OTC analgesics and topicals.       FUTURE APPOINTMENTS:       - Follow-up visit in 1 year or as needed.    Roland Kearns MD  [facility]

## 2018-08-07 ENCOUNTER — TELEPHONE (OUTPATIENT)
Dept: FAMILY MEDICINE | Facility: OTHER | Age: 68
End: 2018-08-07

## 2018-08-07 DIAGNOSIS — E78.5 HYPERLIPIDEMIA LDL GOAL <130: Primary | ICD-10-CM

## 2018-08-07 NOTE — TELEPHONE ENCOUNTER
Reason for Call: Request for an order or referral:    Order or referral being requested: Labs-fasting    Date needed: as soon as possible    Has the patient been seen by the PCP for this problem? YES    Additional comments: Patient had to reschedule physical appt for a later time in the day due to provider being out so patient would like orders put in to get his fasting labs done before appt. Please advise.    Phone number Patient can be reached at:  Home number on file [phone] (home)    Best Time:  any    Can we leave a detailed message on this number?  YES    Call taken on 8/7/2018 at 4:29 PM by Diane Abad

## 2018-08-08 NOTE — TELEPHONE ENCOUNTER
Please notify patient orders placed for fasting labs and will need lab only appointment.  Electronically signed by Roland Kearns MD

## 2018-08-27 ENCOUNTER — OFFICE VISIT (OUTPATIENT)
Dept: FAMILY MEDICINE | Facility: OTHER | Age: 68
End: 2018-08-27
Payer: MEDICARE

## 2018-08-27 VITALS
HEART RATE: 88 BPM | TEMPERATURE: 97.9 F | RESPIRATION RATE: 18 BRPM | WEIGHT: 216.8 LBS | BODY MASS INDEX: 29.36 KG/M2 | OXYGEN SATURATION: 94 % | DIASTOLIC BLOOD PRESSURE: 70 MMHG | SYSTOLIC BLOOD PRESSURE: 130 MMHG | HEIGHT: 72 IN

## 2018-08-27 DIAGNOSIS — M25.511 RIGHT SHOULDER PAIN, UNSPECIFIED CHRONICITY: ICD-10-CM

## 2018-08-27 DIAGNOSIS — E78.5 HYPERLIPIDEMIA LDL GOAL <130: ICD-10-CM

## 2018-08-27 DIAGNOSIS — Z00.00 MEDICARE ANNUAL WELLNESS VISIT, SUBSEQUENT: Primary | ICD-10-CM

## 2018-08-27 LAB
ANION GAP SERPL CALCULATED.3IONS-SCNC: 7 MMOL/L (ref 3–14)
BUN SERPL-MCNC: 14 MG/DL (ref 7–30)
CALCIUM SERPL-MCNC: 8.5 MG/DL (ref 8.5–10.1)
CHLORIDE SERPL-SCNC: 109 MMOL/L (ref 94–109)
CHOLEST SERPL-MCNC: 204 MG/DL
CO2 SERPL-SCNC: 27 MMOL/L (ref 20–32)
CREAT SERPL-MCNC: 0.85 MG/DL (ref 0.66–1.25)
GFR SERPL CREATININE-BSD FRML MDRD: 90 ML/MIN/1.7M2
GLUCOSE SERPL-MCNC: 105 MG/DL (ref 70–99)
HDLC SERPL-MCNC: 44 MG/DL
LDLC SERPL CALC-MCNC: 119 MG/DL
NONHDLC SERPL-MCNC: 160 MG/DL
POTASSIUM SERPL-SCNC: 4.2 MMOL/L (ref 3.4–5.3)
SODIUM SERPL-SCNC: 143 MMOL/L (ref 133–144)
TRIGL SERPL-MCNC: 203 MG/DL

## 2018-08-27 PROCEDURE — 36415 COLL VENOUS BLD VENIPUNCTURE: CPT | Performed by: FAMILY MEDICINE

## 2018-08-27 PROCEDURE — 80061 LIPID PANEL: CPT | Performed by: FAMILY MEDICINE

## 2018-08-27 PROCEDURE — 80048 BASIC METABOLIC PNL TOTAL CA: CPT | Performed by: FAMILY MEDICINE

## 2018-08-27 PROCEDURE — G0439 PPPS, SUBSEQ VISIT: HCPCS | Performed by: FAMILY MEDICINE

## 2018-08-27 ASSESSMENT — PAIN SCALES - GENERAL: PAINLEVEL: NO PAIN (0)

## 2018-08-27 NOTE — MR AVS SNAPSHOT
After Visit Summary   8/27/2018    Dominic Silva    MRN: [id number]           Patient Information     Date Of Birth          1950        Visit Information        Provider Department      8/27/2018 3:10 PM Roland Kearns MD [facility]        Today's Diagnoses     Medicare annual wellness visit, subsequent    -  1    Right shoulder pain, unspecified chronicity          Care Instructions      Preventive Health Recommendations:       Male Ages 65 and over    Yearly exam:             See your health care provider every year in order to  o   Review health changes.   o   Discuss preventive care.    o   Review your medicines if your doctor has prescribed any.    Talk with your health care provider about whether you should have a test to screen for prostate cancer (PSA).    Every 3 years, have a diabetes test (fasting glucose). If you are at risk for diabetes, you should have this test more often.    Every 5 years, have a cholesterol test. Have this test more often if you are at risk for high cholesterol or heart disease.     Every 10 years, have a colonoscopy. Or, have a yearly FIT test (stool test). These exams will check for colon cancer.    Talk to with your health care provider about screening for Abdominal Aortic Aneurysm if you have a family history of AAA or have a history of smoking.  Shots:     Get a flu shot each year.     Get a tetanus shot every 10 years.     Talk to your doctor about your pneumonia vaccines. There are now two you should receive - Pneumovax (PPSV 23) and Prevnar (PCV 13).    Talk to your pharmacist about a shingles vaccine.     Talk to your doctor about the hepatitis B vaccine.  Nutrition:     Eat at least 5 servings of fruits and vegetables each day.     Eat whole-grain bread, whole-wheat pasta and brown rice instead of white grains and rice.     Get adequate Calcium and Vitamin D.   Lifestyle    Exercise for at least 150 minutes a week (30 minutes a day, 5  days a week). This will help you control your weight and prevent disease.     Limit alcohol to one drink per day.     No smoking.     Wear sunscreen to prevent skin cancer.     See your dentist every six months for an exam and cleaning.     See your eye doctor every 1 to 2 years to screen for conditions such as glaucoma, macular degeneration and cataracts.          Follow-ups after your visit        Additional Services     PHYSICAL THERAPY REFERRAL       *This order will print in the [facility] Central Scheduling Office*    [facility] provides Physical Therapy evaluation and treatment and many specialty services across the [facility] system.  If requesting a specialty program, please choose from the list below.    Call one number to schedule at any [facility] location   [phone].    Treatment: Evaluation & Treatment  Special Instructions/Modalities:   Special Programs: None    Please be aware that coverage of these services is subject to the terms and limitations of your health insurance plan.  Call member services at your health plan with any benefit or coverage questions.      **Note to Provider** To refer patients to therapy outside of the location list, change the order class to External Referral in the order composer.                  Follow-up notes from your care team     Return in about 1 year (around 8/27/2019) for Annual wellness assessment.      Who to contact     If you have questions or need follow up information about today's clinic visit or your schedule please contact [facility] directly at [phone].  Normal or non-critical lab and imaging results will be communicated to you by MyChart, letter or phone within 4 business days after the clinic has received the results. If you do not hear from us within 7 days, please contact the clinic through MyChart or phone. If you have a critical or abnormal lab result, we will  "notify you by phone as soon as possible.  Submit refill requests through Sodraft or call your pharmacy and they will forward the refill request to us. Please allow 3 business days for your refill to be completed.          Additional Information About Your Visit        Pictur[place]art Information     Sodraft lets you send messages to your doctor, view your test results, renew your prescriptions, schedule appointments and more. To sign up, go to [email or web address] . Click on \"Log in\" on the left side of the screen, which will take you to the Welcome page. Then click on \"Sign up Now\" on the right side of the page.     You will be asked to enter the access code listed below, as well as some personal information. Please follow the directions to create your username and password.     Your access code is: [email or web address]  Expires: 2018  4:07 PM     Your access code will  in 90 days. If you need help or a new code, please call your [facility] clinic or [phone].        Care EveryWhere ID     This is your Care EveryWhere ID. This could be used by other organizations to access your [facility] medical records  [id number]        Your Vitals Were     Pulse Temperature Respirations Height Pulse Oximetry BMI (Body Mass Index)    88 97.9  F (36.6  C) (Temporal) 18 5' 11.5\" (1.816 m) 94% 29.82 kg/m2       Blood Pressure from Last 3 Encounters:   18 130/70   17 120/76   17 102/80    Weight from Last 3 Encounters:   18 216 lb 12.8 oz (98.3 kg)   17 214 lb 8 oz (97.3 kg)   17 207 lb 9.6 oz (94.2 kg)              We Performed the Following     PHYSICAL THERAPY REFERRAL        Primary Care Provider Office Phone # Fax #    Roland Kearns -[phone]       [address] [facility] [address]        Equal Access to Services     ASIA LUX AH: Debbie Lopez, tasia owen, daniele dangelosh la'aan ah. So [facility] " [phone].    ATENCIÓN: Si prakash benoit, tiene a crockett disposición servicios gratuitos de asistencia lingüística. Jojo al [phone].    We comply with applicable federal civil rights laws and [place] laws. We do not discriminate on the basis of race, color, national origin, age, disability, sex, sexual orientation, or gender identity.            Thank you!     Thank you for choosing [facility]  for your care. Our goal is always to provide you with excellent care. Hearing back from our patients is one way we can continue to improve our services. Please take a few minutes to complete the written survey that you may receive in the mail after your visit with us. Thank you!             Your Updated Medication List - Protect others around you: Learn how to safely use, store and throw away your medicines at www.disposemymeds.org.          This list is accurate as of 8/27/18  4:07 PM.  Always use your most recent med list.                   Brand Name Dispense Instructions for use Diagnosis    ASPIRIN PO           IBUPROFEN PO      Take 200 mg by mouth        simvastatin 40 MG tablet    ZOCOR    90 tablet    Take 1 tablet (40 mg) by mouth At Bedtime    Mixed hyperlipidemia

## 2018-08-27 NOTE — PROGRESS NOTES
SUBJECTIVE:   Dominic Silva is a 68 year old male who presents for Preventive Visit.    Are you in the first 12 months of your Medicare Part B coverage?  No    Healthy Habits:    Do you get at least three servings of calcium containing foods daily (dairy, green leafy vegetables, etc.)? yes    Amount of exercise or daily activities, outside of work: 7 day(s) per week    Problems taking medications regularly No    Medication side effects: No    Have you had an eye exam in the past two years? yes    Do you see a dentist twice per year? yes    Do you have sleep apnea, excessive snoring or daytime drowsiness?no      Ability to successfully perform activities of daily living: Yes, no assistance needed    Home safety:  none identified     Hearing impairment: No    Fall risk:  Fallen 2 or more times in the past year?: No  Any fall with injury in the past year?: No        COGNITIVE SCREEN  1) Repeat 3 items (Leader, Season, Table)    2) Clock draw: NORMAL  3) 3 item recall:Recalls 3 objects  Results: 3 items recalled: COGNITIVE IMPAIRMENT LESS LIKELY    Mini-CogTM Copyright ESPERANZA Ceron. Licensed by the author for use in [facility] [employer]; reprinted with permission (soob@[facility]). All rights reserved.      Concern: Right shoulder has been catching when moving it up about head.       Hyperlipidemia Follow-Up      Rate your low fat/cholesterol diet?: good    Taking statin?  Yes, no muscle aches from statin    Other lipid medications/supplements?:  none      Reviewed and updated as needed this visit by clinical staff  Tobacco  Allergies  Meds  Problems  Med Hx  Surg Hx  Fam Hx  Soc Hx          Reviewed and updated as needed this visit by Provider  Allergies  Meds  Problems        Social History   Substance Use Topics     Smoking status: Never Smoker     Smokeless tobacco: Never Used     Alcohol use Yes      Comment: occasional, twice a month       If you drink alcohol do you typically have >3 drinks per day or  >7 drinks per week? No                        Today's PHQ-2 Score:   PHQ-2 ( 1999 [employer]) 8/27/2018 8/18/2017   Q1: Little interest or pleasure in doing things 0 0   Q2: Feeling down, depressed or hopeless 0 0   PHQ-2 Score 0 0       Do you feel safe in your environment - Yes    Do you have a Health Care Directive?: No: Advance care planning was reviewed with patient; patient declined at this time.    Current providers sharing in care for this patient include:   Patient Care Team:  Roland Kearns MD as PCP - General    The following health maintenance items are reviewed in Epic and correct as of today:  Health Maintenance   Topic Date Due     PNEUMOCOCCAL (1 of 2 - PCV13) 04/01/2017     FALL RISK ASSESSMENT  08/18/2018     PHQ-2 Q1 YR  08/18/2018     LIPID MONITORING Q1 YEAR  09/13/2018     INFLUENZA VACCINE (1) 09/01/2018     TETANUS IMMUNIZATION (SYSTEM ASSIGNED)  04/29/2019     ADVANCE DIRECTIVE PLANNING Q5 YRS  08/27/2023     COLONOSCOPY Q10 YR  04/13/2025     AORTIC ANEURYSM SCREENING (SYSTEM ASSIGNED)  Completed     HEPATITIS C SCREENING  Completed     Labs reviewed in EPIC  BP Readings from Last 3 Encounters:   08/27/18 130/70   12/08/17 120/76   08/18/17 102/80    Wt Readings from Last 3 Encounters:   08/27/18 216 lb 12.8 oz (98.3 kg)   12/08/17 214 lb 8 oz (97.3 kg)   08/18/17 207 lb 9.6 oz (94.2 kg)                  Patient Active Problem List   Diagnosis     HYPERLIPIDEMIA LDL GOAL <130     Advanced directives, counseling/discussion     PMR (polymyalgia rheumatica) (H)     Acquired elevated diaphragm     Cervical spondylosis without myelopathy     Past Surgical History:   Procedure Laterality Date     COLONOSCOPY N/A 4/13/2015    Procedure: COLONOSCOPY;  Surgeon: Ajay Amador MD;  Location: [facility]     HC AMPUTATE FINGER/THUMB,SINGLE W/WO INTER TFR      2 fingers     HC COLONOSCOPY THRU STOMA, DIAGNOSTIC  2005    Normal-repeat 2015       Social History   Substance Use Topics     Smoking status: Never  Smoker     Smokeless tobacco: Never Used     Alcohol use Yes      Comment: occasional, twice a month     Family History   Problem Relation Age of Onset     Cardiovascular Father      pacemaker, CABG     Respiratory Father      GASTROINTESTINAL DISEASE Father      ulcers several years ago     Alzheimer Disease Mother      undiagnosed--has symptoms     Lipids Mother      Eye Disorder Mother      cataract     Lipids Brother      Unknown/Adopted Brother      Unknown/Adopted Brother      HEART DISEASE Sister      pacemaker     C.A.D. Brother      bypass         Current Outpatient Prescriptions   Medication Sig Dispense Refill     simvastatin (ZOCOR) 40 MG tablet Take 1 tablet (40 mg) by mouth At Bedtime 90 tablet 3     ASPIRIN PO        IBUPROFEN PO Take 200 mg by mouth       Allergies   Allergen Reactions     No Known Drug Allergies      Recent Labs   Lab Test  08/27/18   0756  09/13/17   0858  08/05/16   1335  06/09/16   0809  04/01/15   0918   04/17/13   1642  12/17/12   1412   05/16/11   0918   LDL  119*  145*   --   123*  114   < >   --    --    < >  116   HDL  44  47   --   45  54   --    --    --    < >  45   TRIG  203*  128   --   124  99   --    --    --    < >  105   ALT   --    --   28   --    --    --    --   40   --   34   CR  0.85   --    --    --   0.87   --   0.82  0.84   < >  0.82   GFRESTIMATED  90   --    --    --   89   --   >90  >90   < >  >90   GFRESTBLACK  >90   --    --    --   >90   GFR Calc     --   >90  >90   < >  >90   POTASSIUM  4.2   --    --    --   3.9   --   4.9  4.6   < >  4.5   TSH   --    --   1.81   --    --    --   0.83   --    --    --     < > = values in this interval not displayed.        Pneumonia Vaccine:Adults age 65+ who have not received previous Pneumovax (PPSV23) or PCV13 as an adult: Should first be given PCV13 AND then should be given PPSV23 6-12 months after PCV13    ROS:  CONSTITUTIONAL: NEGATIVE for fever, chills, change in weight  ENT/MOUTH: NEGATIVE  "for ear, mouth and throat problems  RESP: NEGATIVE for significant cough or SOB  CV: NEGATIVE for chest pain, palpitations or peripheral edema  MUSCULOSKELETAL: POSITIVE  for joint pain right shoulder intermittently and leg pain at the end of the day.  ROS otherwise negative    OBJECTIVE:   /70 (BP Location: Left arm, Patient Position: Chair, Cuff Size: Adult Large)  Pulse 88  Temp 97.9  F (36.6  C) (Temporal)  Resp 18  Ht 5' 11.5\" (1.816 m)  Wt 216 lb 12.8 oz (98.3 kg)  SpO2 94%  BMI 29.82 kg/m2 Estimated body mass index is 29.82 kg/(m^2) as calculated from the following:    Height as of this encounter: 5' 11.5\" (1.816 m).    Weight as of this encounter: 216 lb 12.8 oz (98.3 kg).  EXAM:   GENERAL: healthy, alert and no distress  EYES: Eyes grossly normal to inspection, PERRL and conjunctivae and sclerae normal  HENT: ear canals and TM's normal, nose and mouth without ulcers or lesions  NECK: no adenopathy, no asymmetry, masses, or scars and thyroid normal to palpation  RESP: lungs clear to auscultation - no rales, rhonchi or wheezes  CV: regular rate and rhythm, normal S1 S2, no S3 or S4, no murmur, click or rub, no peripheral edema and peripheral pulses strong  ABDOMEN: soft, nontender, no hepatosplenomegaly, no masses and bowel sounds normal  MS: Exam of the right shoulder revealed tenderness to palpation over the anterior glenohumoral space.  Impingement sign was positive.  Pain on resisted external rotation and abduction,  not on resisted internal rotation.  Pain on raising the arm over the head. No definite weakness noted.   SKIN: no suspicious lesions or rashes  NEURO: Normal strength and tone, mentation intact and speech normal  PSYCH: mentation appears normal, affect normal/bright    Diagnostic Test Results:  Results for orders placed or performed in visit on 08/27/18 (from the past 24 hour(s))   Basic metabolic panel   Result Value Ref Range    Sodium 143 133 - 144 mmol/L    Potassium 4.2 3.4 " "- 5.3 mmol/L    Chloride 109 94 - 109 mmol/L    Carbon Dioxide 27 20 - 32 mmol/L    Anion Gap 7 3 - 14 mmol/L    Glucose 105 (H) 70 - 99 mg/dL    Urea Nitrogen 14 7 - 30 mg/dL    Creatinine 0.85 0.66 - 1.25 mg/dL    GFR Estimate 90 >60 mL/min/1.7m2    GFR Estimate If Black >90 >60 mL/min/1.7m2    Calcium 8.5 8.5 - 10.1 mg/dL   Lipid panel reflex to direct LDL Fasting   Result Value Ref Range    Cholesterol 204 (H) <200 mg/dL    Triglycerides 203 (H) <150 mg/dL    HDL Cholesterol 44 >39 mg/dL    LDL Cholesterol Calculated 119 (H) <100 mg/dL    Non HDL Cholesterol 160 (H) <130 mg/dL       ASSESSMENT / PLAN:       ICD-10-CM    1. Medicare annual wellness visit, subsequent Z00.00    2. Right shoulder pain, unspecified chronicity M25.511 PHYSICAL THERAPY REFERRAL       End of Life Planning:  Patient currently has an advanced directive: No.  I have verified the patient's ablity to prepare an advanced directive/make health care decisions.  Literature was provided to assist patient in preparing an advanced directive.    COUNSELING:  Reviewed preventive health counseling, as reflected in patient instructions       Regular exercise       Healthy diet/nutrition       Immunizations    Recommend Pneumococcal and Zoster          BP Readings from Last 1 Encounters:   08/27/18 130/70     Estimated body mass index is 29.82 kg/(m^2) as calculated from the following:    Height as of this encounter: 5' 11.5\" (1.816 m).    Weight as of this encounter: 216 lb 12.8 oz (98.3 kg).    BP Screening:   Last 3 BP Readings:    BP Readings from Last 3 Encounters:   08/27/18 130/70   12/08/17 120/76   08/18/17 102/80       The following was recommended to the patient:  Re-screen BP within a year and recommended lifestyle modifications  Weight management plan: Discussed healthy diet and exercise guidelines and patient will follow up in 12 months in clinic to re-evaluate.     reports that he has never smoked. He has never used smokeless " tobacco.      Appropriate preventive services were discussed with this patient, including applicable screening as appropriate for cardiovascular disease, diabetes, osteopenia/osteoporosis, and glaucoma.  As appropriate for age/gender, discussed screening for colorectal cancer, prostate cancer, breast cancer, and cervical cancer. Checklist reviewing preventive services available has been given to the patient.    Reviewed patients plan of care and provided an AVS. The Basic Care Plan (routine screening as documented in Health Maintenance) for Dominic meets the Care Plan requirement. This Care Plan has been established and reviewed with the Patient.    The 10-year ASCVD risk score (Aletafaraz PERDOMO Jr, et al., 2013) is: 15.2%    Values used to calculate the score:      Age: 68 years      Sex: Male      Is Non- : No      Diabetic: No      Tobacco smoker: No      Systolic Blood Pressure: 130 mmHg      Is BP treated: No      HDL Cholesterol: 44 mg/dL      Total Cholesterol: 204 mg/dL    Counseling Resources:  ATP IV Guidelines  Pooled Cohorts Equation Calculator  Breast Cancer Risk Calculator  FRAX Risk Assessment  ICSI Preventive Guidelines  Dietary Guidelines for Americans, 2010  USDA's MyPlate  ASA Prophylaxis  Lung CA Screening    Roland Kearns MD  [facility]

## 2018-12-27 ENCOUNTER — OFFICE VISIT (OUTPATIENT)
Dept: URGENT CARE | Facility: RETAIL CLINIC | Age: 68
End: 2018-12-27
Payer: MEDICARE

## 2018-12-27 VITALS — OXYGEN SATURATION: 96 % | SYSTOLIC BLOOD PRESSURE: 121 MMHG | DIASTOLIC BLOOD PRESSURE: 84 MMHG | HEART RATE: 85 BPM

## 2018-12-27 DIAGNOSIS — H61.23 BILATERAL IMPACTED CERUMEN: Primary | ICD-10-CM

## 2018-12-27 PROCEDURE — 99213 OFFICE O/P EST LOW 20 MIN: CPT | Performed by: INTERNAL MEDICINE

## 2018-12-27 NOTE — PROGRESS NOTES
[facility] Progress Note        John Zaragoza MD, MPH  12/27/2018        History:      A pleasant 68 year old year old male is seen for fullness of bilateral ears x 1 week. No pain or drainage is referred. No headache or sore throat is referred.         Assessment and Plan:        Cerumen impaction ( bilateral ears):  Both EAC's were irrigated w tap water after obtaining patient's verbal consent.  Post procedure evaluation revealed clear and patent bilateral EAC's and TM's w/o erythema or exudate.  Patient tolerated the procedure well and was able to hear better.  Follow-up with your PCP as needed.                   Physical Exam:      /84 (BP Location: Right arm, Patient Position: Chair, Cuff Size: Adult Large)   Pulse 85   SpO2 96%      Constitutional: Patient is in no distress The patient is pleasant and cooperative.   HEENT: Head:  Head is atraumatic, normocephalic.    Eyes: Pupils are equal, round and reactive to light and accomodation.  Sclera is non-icteric. No conjunctival injection, or exudate noted. Extraocular motion is intact. Visual acuity is intact bilaterally.  Ears:  External acoustic canals w cerumen bilaterally.  Unable to visualize bilateral TM's. No tenderness or swelling of mastoid processes.  Nose:  No Nasal congestion w/o drainage or mucosal ulceration is noted.  Throat:  Oral mucosa is moist.  No oral lesions are noted.  No posterior pharyngeal hyperemia or exudate noted.     Neck Supple.  There is no cervical lymphadenopathy.  No nuchal rigidity noted.  There is no meningismus.     Cardiovascular: Heart is regular to rate and rhythm.  No murmur is noted.     Chest. Chest Symmetrical, no soft tissues, swelling, or tenderness upon palpation   Lungs: Clear in the anterior and posterior pulmonary fields.   Abdomen: Soft and non-tender.    Back No flank tenderness is noted.   Extremeties No edema, no calf tenderness.   Neuro: No focal deficit.   Skin No petechiae  or purpura is noted.  There is no rash.   Mood Normal              Medications:        PRN Meds:          Data:      All new lab and imaging data was reviewed.   Results for orders placed or performed in visit on 08/27/18   Basic metabolic panel   Result Value Ref Range    Sodium 143 133 - 144 mmol/L    Potassium 4.2 3.4 - 5.3 mmol/L    Chloride 109 94 - 109 mmol/L    Carbon Dioxide 27 20 - 32 mmol/L    Anion Gap 7 3 - 14 mmol/L    Glucose 105 (H) 70 - 99 mg/dL    Urea Nitrogen 14 7 - 30 mg/dL    Creatinine 0.85 0.66 - 1.25 mg/dL    GFR Estimate 90 >60 mL/min/1.7m2    GFR Estimate If Black >90 >60 mL/min/1.7m2    Calcium 8.5 8.5 - 10.1 mg/dL   Lipid panel reflex to direct LDL Fasting   Result Value Ref Range    Cholesterol 204 (H) <200 mg/dL    Triglycerides 203 (H) <150 mg/dL    HDL Cholesterol 44 >39 mg/dL    LDL Cholesterol Calculated 119 (H) <100 mg/dL    Non HDL Cholesterol 160 (H) <130 mg/dL

## 2019-01-24 DIAGNOSIS — E78.2 MIXED HYPERLIPIDEMIA: ICD-10-CM

## 2019-01-24 RX ORDER — SIMVASTATIN 40 MG
TABLET ORAL
Qty: 90 TABLET | Refills: 1 | Status: SHIPPED | OUTPATIENT
Start: 2019-01-24 | End: 2019-07-22

## 2019-01-24 NOTE — TELEPHONE ENCOUNTER
"Requested Prescriptions   Pending Prescriptions Disp Refills     simvastatin (ZOCOR) 40 MG tablet [Pharmacy Med Name: SIMVASTATIN 40MG TABLET] 90 tablet 3    Last Written Prescription Date:  12/8/17  Last Fill Quantity: 90,  # refills: 3   Last office visit: 8/27/2018 with prescribing provider:  8/27/18   Future Office Visit:     Sig: TAKE 1 TABLET BY MOUTH DAILY AT BEDTIME    Statins Protocol Passed - 1/24/2019 10:08 AM       Passed - LDL on file in past 12 months    Recent Labs   Lab Test 08/27/18  0756   *            Passed - No abnormal creatine kinase in past 12 months    Recent Labs   Lab Test 12/17/12  1412   CKT 83               Passed - Recent (12 mo) or future (30 days) visit within the authorizing provider's specialty    Patient had office visit in the last 12 months or has a visit in the next 30 days with authorizing provider or within the authorizing provider's specialty.  See \"Patient Info\" tab in inbasket, or \"Choose Columns\" in Meds & Orders section of the refill encounter.             Passed - Medication is active on med list       Passed - Patient is age 18 or older        "

## 2019-01-24 NOTE — TELEPHONE ENCOUNTER
Prescription approved per [facility] Refill Protocol.    ANDREIA PerezN, RN  [facility]

## 2019-04-03 ENCOUNTER — OFFICE VISIT (OUTPATIENT)
Dept: FAMILY MEDICINE | Facility: OTHER | Age: 69
End: 2019-04-03
Payer: MEDICARE

## 2019-04-03 VITALS
BODY MASS INDEX: 30.13 KG/M2 | SYSTOLIC BLOOD PRESSURE: 118 MMHG | OXYGEN SATURATION: 93 % | DIASTOLIC BLOOD PRESSURE: 68 MMHG | RESPIRATION RATE: 20 BRPM | TEMPERATURE: 97.9 F | WEIGHT: 219.06 LBS | HEART RATE: 100 BPM

## 2019-04-03 DIAGNOSIS — M75.51 BURSITIS OF RIGHT SHOULDER: ICD-10-CM

## 2019-04-03 DIAGNOSIS — Z82.49 FAMILY HISTORY OF ASCVD: Primary | ICD-10-CM

## 2019-04-03 DIAGNOSIS — R06.09 DYSPNEA ON EXERTION: ICD-10-CM

## 2019-04-03 PROCEDURE — 99214 OFFICE O/P EST MOD 30 MIN: CPT | Performed by: FAMILY MEDICINE

## 2019-04-03 ASSESSMENT — PAIN SCALES - GENERAL: PAINLEVEL: MODERATE PAIN (5)

## 2019-04-03 NOTE — PROGRESS NOTES
SUBJECTIVE:   Dominic Silva is a 69 year old male who presents to clinic today for the following health issues:        Want to discuss possible stress test or others test due to heart problems and heart attacks.     Hyperlipidemia Follow-Up      Rate your low fat/cholesterol diet?: good    Taking statin?  Yes, no muscle aches from statin    Other lipid medications/supplements?:  none      Problem list and histories reviewed & adjusted, as indicated.  Additional history: There is a 69-year-old male comes in with his wife Nina today to discuss recent family history changes.  Dominic has 2 brothers and one sister all recently who have had heart attacks.  He admits that they are all smokers and have not maintained healthy lifestyles.  He himself has a history of hyperlipidemia for which she is currently on high-dose statin.  He tolerates this well.  He denies any chest pain or shortness of breath with activity except at extreme limits such as chopping wood, chain saw use, heavy since she had shoveling.  He will noticed some slight shortness of breath at the extremes of exertion and rests this resolves.  He has no chest pain at rest he has no accelerated chest pain pressure or shortness of breath.  Previous echocardiogram performed in 2013 was normal with an ejection fraction of 60-65%.  He also complains today of chronic intermittent right shoulder pain.  This is been going on for many years.  Seems to be worse with certain activities.  He was seen for this last fall and I referred him to physical therapy and he did not follow-up with them.    Patient Active Problem List   Diagnosis     HYPERLIPIDEMIA LDL GOAL <130     Advanced directives, counseling/discussion     PMR (polymyalgia rheumatica) (H)     Acquired elevated diaphragm     Cervical spondylosis without myelopathy     Past Surgical History:   Procedure Laterality Date     COLONOSCOPY N/A 4/13/2015    Procedure: COLONOSCOPY;  Surgeon: Ajay Amador MD;   Location: [facility]     HC AMPUTATE FINGER/THUMB,SINGLE W/WO INTER TFR      2 fingers     HC COLONOSCOPY THRU STOMA, DIAGNOSTIC  2005    Normal-repeat 2015       Social History     Tobacco Use     Smoking status: Never Smoker     Smokeless tobacco: Never Used   Substance Use Topics     Alcohol use: Yes     Comment: occasional, twice a month     Family History   Problem Relation Age of Onset     Cardiovascular Father         pacemaker, CABG     Respiratory Father      Gastrointestinal Disease Father         ulcers several years ago     Alzheimer Disease Mother         undiagnosed--has symptoms     Lipids Mother      Eye Disorder Mother         cataract     Lipids Brother      Unknown/Adopted Brother      Myocardial Infarction Brother      Coronary Artery Disease Brother      Myocardial Infarction Sister      Coronary Artery Disease Sister      Unknown/Adopted Brother      Heart Disease Brother      Coronary Artery Disease Brother      Heart Disease Sister         pacemaker     Pacemaker Sister      C.A.D. Brother         bypass         Current Outpatient Medications   Medication Sig Dispense Refill     ASPIRIN PO        IBUPROFEN PO Take 200 mg by mouth       simvastatin (ZOCOR) 40 MG tablet TAKE 1 TABLET BY MOUTH DAILY AT BEDTIME 90 tablet 1     Allergies   Allergen Reactions     No Known Drug Allergies      Recent Labs   Lab Test 08/27/18  0756 09/13/17  0858 08/05/16  1335 06/09/16  0809 04/01/15  0918  04/17/13  1642 12/17/12  1412  05/16/11  0918   * 145*  --  123* 114   < >  --   --    < > 116   HDL 44 47  --  45 54  --   --   --    < > 45   TRIG 203* 128  --  124 99  --   --   --    < > 105   ALT  --   --  28  --   --   --   --  40  --  34   CR 0.85  --   --   --  0.87  --  0.82 0.84   < > 0.82   GFRESTIMATED 90  --   --   --  89  --  >90 >90   < > >90   GFRESTBLACK >90  --   --   --  >90  African American GFR Calc    --  >90 >90   < > >90   POTASSIUM 4.2  --   --   --  3.9  --  4.9 4.6   < > 4.5   TSH  --    --  1.81  --   --   --  0.83  --   --   --     < > = values in this interval not displayed.      BP Readings from Last 3 Encounters:   04/03/19 118/68   12/27/18 121/84   08/27/18 130/70    Wt Readings from Last 3 Encounters:   04/03/19 99.4 kg (219 lb 1 oz)   08/27/18 98.3 kg (216 lb 12.8 oz)   12/08/17 97.3 kg (214 lb 8 oz)                  Labs reviewed in EPIC    Reviewed and updated as needed this visit by clinical staff  Tobacco  Allergies  Meds  Problems  Med Hx  Surg Hx  Fam Hx  Soc Hx        Reviewed and updated as needed this visit by Provider  Tobacco  Allergies  Meds  Problems  Med Hx  Surg Hx  Fam Hx         ROS:  CONSTITUTIONAL: NEGATIVE for fever, chills, change in weight  ENT/MOUTH: NEGATIVE for ear, mouth and throat problems  RESP: NEGATIVE for significant cough or SOB  CV: NEGATIVE for chest pain, palpitations or peripheral edema  MUSCULOSKELETAL: POSITIVE  for joint pain right shoulder  ROS otherwise negative    OBJECTIVE:     /68 (BP Location: Left arm, Patient Position: Chair, Cuff Size: Adult Large)   Pulse 100   Temp 97.9  F (36.6  C) (Temporal)   Resp 20   Wt 99.4 kg (219 lb 1 oz)   SpO2 93%   BMI 30.13 kg/m    Body mass index is 30.13 kg/m .  GENERAL: healthy, alert and no distress  HENT: ear canals and TM's normal, nose and mouth without ulcers or lesions  NECK: no adenopathy, no asymmetry, masses, or scars and thyroid normal to palpation  RESP: lungs clear to auscultation - no rales, rhonchi or wheezes  CV: regular rate and rhythm, normal S1 S2, no S3 or S4, no murmur, click or rub, no peripheral edema and peripheral pulses strong  ABDOMEN: soft, nontender, no hepatosplenomegaly, no masses and bowel sounds normal  MS: Shoulder exam - both sides normal; full range of motion, no pain on motion, slight tenderness but no deformity noted.    Diagnostic Test Results:  none     ASSESSMENT/PLAN:     1. Family history of ASCVD  Strong family history of ASCVD. Patient  other risk factors include HL only. He is currently asymptomatic except for slight dyspnea with extreme exertion. He would like to know if he has increased risk for MI. Will obtain stress ECHO to further risk stratify. Would recommend low dose ASA consistently, he takes it inconsistently at this time. Continue statin.   - Echocardiogram Exercise Stress; Future    The 10-year ASCVD risk score (Aleta PERDOMO Jr., et al., 2013) is: 13.9%    Values used to calculate the score:      Age: 69 years      Sex: Male      Is Non- : No      Diabetic: No      Tobacco smoker: No      Systolic Blood Pressure: 118 mmHg      Is BP treated: No      HDL Cholesterol: 44 mg/dL      Total Cholesterol: 204 mg/dL     2. Dyspnea on exertion  Strong family history of ASCVD. Patient other risk factors include HL only. He is currently asymptomatic except for slight dyspnea with extreme exertion. He would like to know if he has increased risk for MI. Will obtain stress ECHO to further risk stratify. Would recommend low dose ASA consistently, he takes it inconsistently at this time. Continue statin.   - Echocardiogram Exercise Stress; Future    3. Bursitis of right shoulder  Chronic right shoulder bursitis, tendonitis. Refer to PT. OTC tylenol, ibuprofen or topicals effective. Follow up if not improved with PT.  - PHYSICAL THERAPY REFERRAL; Future    FURTHER TESTING:       - Stress ECHO  Return in about 4 months (around 8/3/2019) for Medicare Wellness Visit.     Roland Kearns MD  [facility]

## 2019-04-08 ENCOUNTER — HOSPITAL ENCOUNTER (OUTPATIENT)
Dept: CARDIOLOGY | Facility: CLINIC | Age: 69
Discharge: HOME | End: 2019-04-08
Attending: FAMILY MEDICINE | Admitting: FAMILY MEDICINE
Payer: MEDICARE

## 2019-04-08 DIAGNOSIS — R06.09 DYSPNEA ON EXERTION: ICD-10-CM

## 2019-04-08 DIAGNOSIS — Z82.49 FAMILY HISTORY OF ASCVD: ICD-10-CM

## 2019-04-08 PROCEDURE — 93018 CV STRESS TEST I&R ONLY: CPT | Performed by: INTERNAL MEDICINE

## 2019-04-08 PROCEDURE — 93016 CV STRESS TEST SUPVJ ONLY: CPT | Performed by: INTERNAL MEDICINE

## 2019-04-08 PROCEDURE — 93325 DOPPLER ECHO COLOR FLOW MAPG: CPT | Mod: TC

## 2019-04-08 PROCEDURE — 93321 DOPPLER ECHO F-UP/LMTD STD: CPT | Mod: 26 | Performed by: INTERNAL MEDICINE

## 2019-04-08 PROCEDURE — 93350 STRESS TTE ONLY: CPT | Mod: 26 | Performed by: INTERNAL MEDICINE

## 2019-04-08 PROCEDURE — 93325 DOPPLER ECHO COLOR FLOW MAPG: CPT | Mod: 26 | Performed by: INTERNAL MEDICINE

## 2019-04-17 ENCOUNTER — HOSPITAL ENCOUNTER (OUTPATIENT)
Dept: PHYSICAL THERAPY | Facility: OTHER | Age: 69
Setting detail: THERAPIES SERIES
End: 2019-04-17
Attending: FAMILY MEDICINE
Payer: MEDICARE

## 2019-04-17 DIAGNOSIS — M75.51 BURSITIS OF RIGHT SHOULDER: ICD-10-CM

## 2019-04-17 PROCEDURE — 97110 THERAPEUTIC EXERCISES: CPT | Mod: GP | Performed by: PHYSICAL THERAPIST

## 2019-04-17 PROCEDURE — 97161 PT EVAL LOW COMPLEX 20 MIN: CPT | Mod: GP | Performed by: PHYSICAL THERAPIST

## 2019-04-17 NOTE — PROGRESS NOTES
[facility]          OUTPATIENT PHYSICAL THERAPY ORTHOPEDIC EVALUATION  PLAN OF TREATMENT FOR OUTPATIENT REHABILITATION  (COMPLETE FOR INITIAL CLAIMS ONLY)  Patient's Last Name, First Name, M.I.  YOB: 1950  Dominic Silva    Provider s Name:  [facility]   Medical Record No.  [id number]   Start of Care Date:  04/17/19   Onset Date:  04/17/18   Type:     _X__PT   ___OT   ___SLP Medical Diagnosis:  bursitis of right shoulder M75.51     PT Diagnosis:  right shoulder pain , stiff    Visits from SOC:  1      _________________________________________________________________________________  Plan of Treatment/Functional Goals:  ADL retraining, ROM, strengthening, stretching        modalities as needed   Goals  Goal Identifier: 1  Goal Description: instruction in HEP and compliant with it 5 of 7 days   Target Date: 05/17/19    Goal Identifier: 2  Goal Description: patient to have reduction in pain level currently 0-5/10 goal is 0-3/10  Target Date: 05/17/19                                                                      Therapy Frequency:     Predicted Duration of Therapy Intervention:  patient requested 1x instruction in HEP , will leave chart open x 1 month to follow up if needed     Naomi Yung, PT                 I CERTIFY THE NEED FOR THESE SERVICES FURNISHED UNDER        THIS PLAN OF TREATMENT AND WHILE UNDER MY CARE     (Physician co-signature of this document indicates review and certification of the therapy plan).                       Certification Date From:  04/17/19   Certification Date To:  05/17/19    Referring Provider:  christiano ruavlcaba MD     Initial Assessment        See Epic Evaluation Start of Care Date: 04/17/19

## 2019-07-22 DIAGNOSIS — E78.2 MIXED HYPERLIPIDEMIA: ICD-10-CM

## 2019-07-23 RX ORDER — SIMVASTATIN 40 MG
TABLET ORAL
Qty: 90 TABLET | Refills: 0 | Status: SHIPPED | OUTPATIENT
Start: 2019-07-23 | End: 2019-09-13

## 2019-07-23 NOTE — TELEPHONE ENCOUNTER
Prescription approved per [facility] Refill Protocol.    ANDREIA PerezN, RN  [facility]

## 2019-09-13 ENCOUNTER — OFFICE VISIT (OUTPATIENT)
Dept: FAMILY MEDICINE | Facility: OTHER | Age: 69
End: 2019-09-13
Payer: MEDICARE

## 2019-09-13 VITALS
WEIGHT: 220 LBS | OXYGEN SATURATION: 96 % | HEIGHT: 71 IN | SYSTOLIC BLOOD PRESSURE: 116 MMHG | HEART RATE: 78 BPM | BODY MASS INDEX: 30.8 KG/M2 | TEMPERATURE: 97.7 F | DIASTOLIC BLOOD PRESSURE: 70 MMHG | RESPIRATION RATE: 18 BRPM

## 2019-09-13 DIAGNOSIS — Z00.00 ENCOUNTER FOR MEDICARE ANNUAL WELLNESS EXAM: ICD-10-CM

## 2019-09-13 DIAGNOSIS — Z12.5 SCREENING FOR PROSTATE CANCER: ICD-10-CM

## 2019-09-13 DIAGNOSIS — M35.3 PMR (POLYMYALGIA RHEUMATICA) (H): ICD-10-CM

## 2019-09-13 DIAGNOSIS — E78.2 MIXED HYPERLIPIDEMIA: ICD-10-CM

## 2019-09-13 DIAGNOSIS — E78.5 HYPERLIPIDEMIA LDL GOAL <130: Primary | ICD-10-CM

## 2019-09-13 LAB
ANION GAP SERPL CALCULATED.3IONS-SCNC: 6 MMOL/L (ref 3–14)
BUN SERPL-MCNC: 15 MG/DL (ref 7–30)
CALCIUM SERPL-MCNC: 8.9 MG/DL (ref 8.5–10.1)
CHLORIDE SERPL-SCNC: 110 MMOL/L (ref 94–109)
CHOLEST SERPL-MCNC: 213 MG/DL
CO2 SERPL-SCNC: 25 MMOL/L (ref 20–32)
CREAT SERPL-MCNC: 0.81 MG/DL (ref 0.66–1.25)
GFR SERPL CREATININE-BSD FRML MDRD: >90 ML/MIN/{1.73_M2}
GLUCOSE SERPL-MCNC: 94 MG/DL (ref 70–99)
HDLC SERPL-MCNC: 51 MG/DL
LDLC SERPL CALC-MCNC: 124 MG/DL
NONHDLC SERPL-MCNC: 162 MG/DL
POTASSIUM SERPL-SCNC: 4.4 MMOL/L (ref 3.4–5.3)
PSA SERPL-ACNC: 1.54 UG/L (ref 0–4)
SODIUM SERPL-SCNC: 141 MMOL/L (ref 133–144)
TRIGL SERPL-MCNC: 191 MG/DL

## 2019-09-13 PROCEDURE — G0439 PPPS, SUBSEQ VISIT: HCPCS | Performed by: FAMILY MEDICINE

## 2019-09-13 PROCEDURE — G0103 PSA SCREENING: HCPCS | Performed by: FAMILY MEDICINE

## 2019-09-13 PROCEDURE — G0008 ADMIN INFLUENZA VIRUS VAC: HCPCS | Performed by: FAMILY MEDICINE

## 2019-09-13 PROCEDURE — 36415 COLL VENOUS BLD VENIPUNCTURE: CPT | Performed by: FAMILY MEDICINE

## 2019-09-13 PROCEDURE — 80048 BASIC METABOLIC PNL TOTAL CA: CPT | Performed by: FAMILY MEDICINE

## 2019-09-13 PROCEDURE — 90662 IIV NO PRSV INCREASED AG IM: CPT | Performed by: FAMILY MEDICINE

## 2019-09-13 PROCEDURE — 80061 LIPID PANEL: CPT | Performed by: FAMILY MEDICINE

## 2019-09-13 RX ORDER — SIMVASTATIN 40 MG
TABLET ORAL
Qty: 90 TABLET | Refills: 3 | Status: SHIPPED | OUTPATIENT
Start: 2019-09-13 | End: 2020-12-03

## 2019-09-13 ASSESSMENT — ENCOUNTER SYMPTOMS
ARTHRALGIAS: 0
DIARRHEA: 0
HEMATURIA: 0
SHORTNESS OF BREATH: 0
NERVOUS/ANXIOUS: 0
WEAKNESS: 0
EYE PAIN: 0
HEMATOCHEZIA: 0
COUGH: 0
HEARTBURN: 0
HEADACHES: 0
DIZZINESS: 0
NAUSEA: 0
MYALGIAS: 0
ABDOMINAL PAIN: 0
JOINT SWELLING: 0
CONSTIPATION: 0
DYSURIA: 0
SORE THROAT: 0
FREQUENCY: 0
FEVER: 0
PALPITATIONS: 0
PARESTHESIAS: 0
CHILLS: 0

## 2019-09-13 ASSESSMENT — ACTIVITIES OF DAILY LIVING (ADL): CURRENT_FUNCTION: NO ASSISTANCE NEEDED

## 2019-09-13 ASSESSMENT — PAIN SCALES - GENERAL: PAINLEVEL: NO PAIN (0)

## 2019-09-13 ASSESSMENT — MIFFLIN-ST. JEOR: SCORE: 1795.04

## 2019-09-13 NOTE — PROGRESS NOTES
"SUBJECTIVE:   Dominic Silva is a 69 year old male who presents for Preventive Visit.      Are you in the first 12 months of your Medicare coverage?  No    Healthy Habits:    In general, how would you rate your overall health?  Very good    Frequency of exercise:  6-7 days/week    Duration of exercise:  Less than 15 minutes    Do you usually eat at least 4 servings of fruit and vegetables a day, include whole grains    & fiber and avoid regularly eating high fat or \"junk\" foods?  No    Taking medications regularly:  Yes    Barriers to taking medications:  None    Medication side effects:  None    Ability to successfully perform activities of daily living:  No assistance needed    Home Safety:  Lack of grab bars in the bathroom and lack of handrails on stairs    Hearing Impairment:  No hearing concerns    In the past 6 months, have you been bothered by leaking of urine?  No    In general, how would you rate your overall mental or emotional health?  Good      PHQ-2 Total Score:    Additional concerns today:  No    Do you feel safe in your environment? Yes    Do you have a Health Care Directive? No: Advance care planning was reviewed with patient; patient declined at this time.      Fall risk  Fallen 2 or more times in the past year?: No  Any fall with injury in the past year?: Yes    Cognitive Screening   1) Repeat 3 items (Leader, Season, Table)    2) Clock draw: NORMAL  3) 3 item recall: Recalls 2 objects   Results: NORMAL clock, 1-2 items recalled: COGNITIVE IMPAIRMENT LESS LIKELY    Mini-CogTM Copyright ESPERANZA Ceron. Licensed by the author for use in [facility]; reprinted with permission (stacy@.[facility]). All rights reserved.      Do you have sleep apnea, excessive snoring or daytime drowsiness?: yes    Reviewed and updated as needed this visit by clinical staff  Tobacco  Allergies  Meds  Problems  Med Hx  Surg Hx  Fam Hx  Soc Hx          Reviewed and updated as needed this visit by " Provider  Tobacco  Allergies  Meds  Problems  Med Hx  Surg Hx  Fam Hx        Social History     Tobacco Use     Smoking status: Never Smoker     Smokeless tobacco: Never Used   Substance Use Topics     Alcohol use: Yes     Comment: occasional, twice a month         Alcohol Use 8/18/2017   Prescreen: >3 drinks/day or >7 drinks/week? The patient does not drink >3 drinks per day nor >7 drinks per week.           Hyperlipidemia Follow-Up      Are you having any of the following symptoms? (Select all that apply)  No complaints of shortness of breath, chest pain or pressure.  No increased sweating or nausea with activity.  No left-sided neck or arm pain.  No complaints of pain in calves when walking 1-2 blocks.    Are you regularly taking any medication or supplement to lower your cholesterol?   Yes- Simvastatin    Are you having muscle aches or other side effects that you think could be caused by your cholesterol lowering medication?  No        Current providers sharing in care for this patient include:   Patient Care Team:  Roland Kearns MD as PCP - General  Roland Kearns MD as Assigned PCP    The following health maintenance items are reviewed in Epic and correct as of today:  Health Maintenance   Topic Date Due     ZOSTER IMMUNIZATION (1 of 2) 04/01/2002     PNEUMOCOCCAL IMMUNIZATION 65+ LOW/MEDIUM RISK (1 of 2 - PCV13) 04/01/2017     PHQ-2  01/01/2019     DTAP/TDAP/TD IMMUNIZATION (2 - Td) 04/29/2019     LIPID  08/27/2019     FALL RISK ASSESSMENT  08/27/2019     INFLUENZA VACCINE (1) 09/01/2019     MEDICARE ANNUAL WELLNESS VISIT  08/27/2019     ADVANCE CARE PLANNING  08/27/2023     COLONOSCOPY  04/13/2025     HEPATITIS C SCREENING  Completed     AORTIC ANEURYSM SCREENING (SYSTEM ASSIGNED)  Completed     IPV IMMUNIZATION  Aged Out     MENINGITIS IMMUNIZATION  Aged Out     Lab work is in process  Labs reviewed in EPIC  BP Readings from Last 3 Encounters:   09/13/19 116/70   04/03/19 118/68   12/27/18 121/84     Wt Readings from Last 3 Encounters:   09/13/19 99.8 kg (220 lb)   04/03/19 99.4 kg (219 lb 1 oz)   08/27/18 98.3 kg (216 lb 12.8 oz)                  Patient Active Problem List   Diagnosis     HYPERLIPIDEMIA LDL GOAL <130     Advanced directives, counseling/discussion     PMR (polymyalgia rheumatica) (H)     Acquired elevated diaphragm     Cervical spondylosis without myelopathy     Past Surgical History:   Procedure Laterality Date     COLONOSCOPY N/A 4/13/2015    Procedure: COLONOSCOPY;  Surgeon: Ajay Amador MD;  Location: [facility]     HC AMPUTATE FINGER/THUMB,SINGLE W/WO INTER TFR      2 fingers     HC COLONOSCOPY THRU STOMA, DIAGNOSTIC  2005    Normal-repeat 2015       Social History     Tobacco Use     Smoking status: Never Smoker     Smokeless tobacco: Never Used   Substance Use Topics     Alcohol use: Yes     Comment: occasional, twice a month     Family History   Problem Relation Age of Onset     Cardiovascular Father         pacemaker, CABG     Respiratory Father      Gastrointestinal Disease Father         ulcers several years ago     Alzheimer Disease Mother         undiagnosed--has symptoms     Lipids Mother      Eye Disorder Mother         cataract     Lipids Brother      Unknown/Adopted Brother      Myocardial Infarction Brother      Coronary Artery Disease Brother      Myocardial Infarction Sister      Coronary Artery Disease Sister      Unknown/Adopted Brother      Heart Disease Brother      Coronary Artery Disease Brother      Heart Disease Sister         pacemaker     Pacemaker Sister      C.A.D. Brother         bypass         Current Outpatient Medications   Medication Sig Dispense Refill     ASPIRIN PO        IBUPROFEN PO Take 200 mg by mouth       simvastatin (ZOCOR) 40 MG tablet TAKE 1 TABLET BY MOUTH DAILY AT BEDTIME 90 tablet 0     Allergies   Allergen Reactions     No Known Drug Allergies      Recent Labs   Lab Test 08/27/18  0756 09/13/17  0858 08/05/16  1335 06/09/16  0809  "04/01/15  0918  04/17/13  1642 12/17/12  1412   * 145*  --  123* 114   < >  --   --    HDL 44 47  --  45 54  --   --   --    TRIG 203* 128  --  124 99  --   --   --    ALT  --   --  28  --   --   --   --  40   CR 0.85  --   --   --  0.87  --  0.82 0.84   GFRESTIMATED 90  --   --   --  89  --  >90 >90   GFRESTBLACK >90  --   --   --  >90  African American GFR Calc    --  >90 >90   POTASSIUM 4.2  --   --   --  3.9  --  4.9 4.6   TSH  --   --  1.81  --   --   --  0.83  --     < > = values in this interval not displayed.          Review of Systems   Constitutional: Negative for chills and fever.   HENT: Negative for congestion, ear pain, hearing loss and sore throat.    Eyes: Negative for pain and visual disturbance.   Respiratory: Negative for cough and shortness of breath.    Cardiovascular: Negative for chest pain, palpitations and peripheral edema.   Gastrointestinal: Negative for abdominal pain, constipation, diarrhea, heartburn, hematochezia and nausea.   Genitourinary: Negative for discharge, dysuria, frequency, genital sores, hematuria, impotence and urgency.   Musculoskeletal: Negative for arthralgias, joint swelling and myalgias.   Skin: Negative for rash.   Neurological: Negative for dizziness, weakness, headaches and paresthesias.   Psychiatric/Behavioral: Negative for mood changes. The patient is not nervous/anxious.          OBJECTIVE:   /70 (BP Location: Right arm, Patient Position: Sitting, Cuff Size: Adult Large)   Pulse 78   Temp 97.7  F (36.5  C) (Temporal)   Resp 18   Ht 1.803 m (5' 11\")   Wt 99.8 kg (220 lb)   SpO2 96%   BMI 30.68 kg/m   Estimated body mass index is 30.68 kg/m  as calculated from the following:    Height as of this encounter: 1.803 m (5' 11\").    Weight as of this encounter: 99.8 kg (220 lb).  Physical Exam  GENERAL: healthy, alert and no distress  EYES: Eyes grossly normal to inspection, PERRL and conjunctivae and sclerae normal  HENT: ear canals and TM's " normal, nose and mouth without ulcers or lesions  NECK: no adenopathy, no asymmetry, masses, or scars and thyroid normal to palpation  RESP: lungs clear to auscultation - no rales, rhonchi or wheezes  CV: regular rate and rhythm, normal S1 S2, no S3 or S4, no murmur, click or rub, no peripheral edema and peripheral pulses strong  ABDOMEN: soft, nontender, no hepatosplenomegaly, no masses and bowel sounds normal  MS: no gross musculoskeletal defects noted, no edema  SKIN: no suspicious lesions or rashes  NEURO: Normal strength and tone, mentation intact and speech normal  PSYCH: mentation appears normal, affect normal/bright  Diabetic foot exam: normal DP and PT pulses, no trophic changes or ulcerative lesions and normal sensory exam    Diagnostic Test Results:  Labs reviewed in Epic  No results found for this or any previous visit (from the past 24 hour(s)).    ASSESSMENT / PLAN:       ICD-10-CM    1. Hyperlipidemia LDL goal <130 E78.5 Lipid panel reflex to direct LDL Fasting   2. Encounter for Medicare annual wellness exam Z00.00 HC FLU VACCINE, INCREASED ANTIGEN, PRESV FREE [29014]     Basic metabolic panel   3. Mixed hyperlipidemia E78.2 simvastatin (ZOCOR) 40 MG tablet   4. PMR (polymyalgia rheumatica) (H) M35.3    5. Screening for prostate cancer Z12.5 PSA, screen       End of Life Planning:  Patient currently has an advanced directive: No.  I have verified the patient's ablity to prepare an advanced directive/make health care decisions.  Literature was provided to assist patient in preparing an advanced directive.    COUNSELING:  Reviewed preventive health counseling, as reflected in patient instructions       Regular exercise       Healthy diet/nutrition       Vision screening       Hearing screening       Dental care       Fall risk prevention       Immunizations    Vaccinated for: Influenza             Colon cancer screening       Prostate cancer screening    Estimated body mass index is 30.68 kg/m  as  "calculated from the following:    Height as of this encounter: 1.803 m (5' 11\").    Weight as of this encounter: 99.8 kg (220 lb).    Weight management plan: Discussed healthy diet and exercise guidelines     reports that he has never smoked. He has never used smokeless tobacco.      Appropriate preventive services were discussed with this patient, including applicable screening as appropriate for cardiovascular disease, diabetes, osteopenia/osteoporosis, and glaucoma.  As appropriate for age/gender, discussed screening for colorectal cancer, prostate cancer, breast cancer, and cervical cancer. Checklist reviewing preventive services available has been given to the patient.    Reviewed patients plan of care and provided an AVS. The Basic Care Plan (routine screening as documented in Health Maintenance) for Dominic meets the Care Plan requirement. This Care Plan has been established and reviewed with the Patient.    Counseling Resources:  ATP IV Guidelines  Pooled Cohorts Equation Calculator  Breast Cancer Risk Calculator  FRAX Risk Assessment  ICSI Preventive Guidelines  Dietary Guidelines for Americans, 2010  USDA's MyPlate  ASA Prophylaxis  Lung CA Screening    Roland Kearns MD  [facility]    Identified Health Risks:  "

## 2019-09-13 NOTE — PROGRESS NOTES
"Subjective     Dominic Silva is a 69 year old male who presents to clinic today for the following health issues:    HPI   Medication Followup of Simvastatin    Taking Medication as prescribed: {.:054120::\"yes\"}    Side Effects:  {NONEORCHOOSE:578962::\"None\"}    Medication Helping Symptoms:  {.:194894::\"yes\"}     {additonal problems for provider to add (Optional):563998}    {HIST REVIEW/ LINKS 2 (Optional):322246}    Reviewed and updated as needed this visit by Provider         Review of Systems   {ROS COMP (Optional):871222}      Objective    There were no vitals taken for this visit.  There is no height or weight on file to calculate BMI.  Physical Exam   {Exam List (Optional):512512}    {Diagnostic Test Results (Optional):092523::\"Diagnostic Test Results:\",\"Labs reviewed in Epic\"}        {PROVIDER CHARTING PREFERENCE:195705}    "

## 2019-09-13 NOTE — PROGRESS NOTES
The patient was counseled and encouraged to consider modifying their diet and eating habits. He was provided with information on recommended healthy diet options.  He is at risk for falling and has been provided with information to reduce the risk of falling at home.

## 2019-09-13 NOTE — PATIENT INSTRUCTIONS
Patient Education   Personalized Prevention Plan  You are due for the preventive services outlined below.  Your care team is available to assist you in scheduling these services.  If you have already completed any of these items, please share that information with your care team to update in your medical record.  Health Maintenance Due   Topic Date Due     Zoster (Shingles) Vaccine (1 of 2) 04/01/2002     Pneumococcal Vaccine (1 of 2 - PCV13) 04/01/2017     PHQ-2  01/01/2019     Diptheria Tetanus Pertussis (DTAP/TDAP/TD) Vaccine (2 - Td) 04/29/2019     Cholesterol Lab  08/27/2019     FALL RISK ASSESSMENT  08/27/2019     Flu Vaccine (1) 09/01/2019     Annual Wellness Visit  08/27/2019       Understanding [employer] MyPlate  The USDA (U.S. Department of Agriculture) has guidelines to help you make healthy food choices. These are called MyPlate. MyPlate shows the food groups that make up healthy meals using the image of a place setting. Before you eat, think about the healthiest choices for what to put onto your plate or into your cup or bowl. To learn more about building a healthy plate, visit www.choosemyplate.gov.    The food groups    Fruits. Any fruit or 100% fruit juice counts as part of the Fruit Group. Fruits may be fresh, canned, frozen, or dried, and may be whole, cut-up, or pureed. Make half your plate fruits and vegetables.    Vegetables. Any vegetable or 100% vegetable juice counts as a member of the Vegetable Group. Vegetables may be fresh, frozen, canned, or dried. They can be served raw or cooked and may be whole, cut-up, or mashed. Make half your plate fruits and vegetables.    Grains. All foods made from grains are part of the Grains Group. These include wheat, rice, oats, cornmeal, and barley such as bread, pasta, oatmeal, cereal, tortillas, and grits. Grains should be no more than a quarter of your plate. At least half of your grains should be whole grains.    Protein. This group includes meat, poultry,  seafood, beans and peas, eggs, processed soy products (like tofu), nuts (including nut butters), and seeds. Make protein choices no more than a quarter of your plate. Meat and poultry choices should be lean or low fat.    Dairy. All fluid milk products and foods made from milk that contain calcium, like yogurt and cheese, are part of the Dairy Group. (Foods that have little calcium, such as cream, butter, and cream cheese, are not part of the group.) Most dairy choices should be low-fat or fat-free.    Oils. These are fats that are liquid at room temperature. They include canola, corn, olive, soybean, and sunflower oil. Foods that are mainly oil include mayonnaise, certain salad dressings, and soft margarines. You should have only 5 to 7 teaspoons of oils a day. You probably already get this much from the food you eat.  Date Last Reviewed: 8/1/2017 2000-2018 [employer]. [address] [place] [address]. All rights reserved. This information is not intended as a substitute for professional medical care. Always follow your healthcare professional's instructions.          Preventing Falls in the Home  An adult or child can fall for many reasons. If you are an older adult, you may fall because your reaction time slows down. Your muscles and joints may get stiff, weak, or less flexible because of illness, medicines, or a physical condition. These things can also make a child more likely to fall or be injured in a fall.  Other health problems that make falls more likely include:    Arthritis    Dizziness or lightheadedness when you get out of bed (orthostatic hypotension)    History of a stroke    Dizziness    Anemia    Certain medicines taken for mental illness    Problems with balance or gait    History of falls with or without an injury    Changes in vision (vision impairment)    Changes in thinking skills and memory (cognitive impairment)  Injuries from a fall can include broken bones,  dislocated joints, and cuts. When these injuries are serious enough, they can make it impossible for you or a child who is injured in a fall to live on his or her own.  Prevention tips  To help prevent falls and fall-related injuries, follow the tips below.   Floors  Make floors safer by doing the following:     Put nonskid pads under area rugs.    Remove throw rugs.    Replace worn floor coverings.    Tack carpets firmly to each step on carpeted stairs. Put nonskid strips on the edges of uncarpeted stairs.    Keep floors and stairs free of clutter and cords.    Arrange furniture so there are clear pathways.    Clean up any spills right away.    Wear shoes that fit.  Bathrooms    Make bathrooms safer by doing the following:     Install grab bars in the tub or shower.    Apply nonskid strips or put a nonskid rubber mat in the tub or shower.    Sit on a bath chair to bathe.    Use bathmats with nonskid backing.  Lighting and the environment  Improve lighting in your home by doing the following:     Keep a flashlight in each room. Or put a lamp next to the bed within easy reach.    Put nightlights in the bedrooms, hallways, kitchen, and bathrooms.    Make sure all stairways have good lighting.    Take your time when going up and down stairs.    Put handrails on both sides of stairs and in walkways for more support. To prevent injury to your wrist or arm, don t use handrails to pull yourself up.    Install grab bars to pull yourself up.    Move or rearrange items that you use often. This will make them easier to find or reach.    Look at your home to find any safety hazards. Especially look at doorways, walkways, and the driveway. Remove or repair any safety problems that you find.  Date Last Reviewed: 8/1/2016 2000-2018 [employer]. [address], [place] [address]. All rights reserved. This information is not intended as a substitute for professional medical care. Always follow your  healthcare professional's instructions.

## 2019-09-13 NOTE — LETTER
September 16, 2019      Dominic Silva  [id number] [id number]        Dear ,    We are writing to inform you of your test results.    Lipid profile, kidney function, fasting glucose and PSA are stable.   The current medical regimen is effective;  continue present plan and medications.     Resulted Orders   Lipid panel reflex to direct LDL Fasting   Result Value Ref Range    Cholesterol 213 (H) <200 mg/dL      Comment:      Desirable:       <200 mg/dl    Triglycerides 191 (H) <150 mg/dL      Comment:      Borderline high:  150-199 mg/dl  High:             200-499 mg/dl  Very high:       >499 mg/dl      HDL Cholesterol 51 >39 mg/dL    LDL Cholesterol Calculated 124 (H) <100 mg/dL      Comment:      Above desirable:  100-129 mg/dl  Borderline High:  130-159 mg/dL  High:             160-189 mg/dL  Very high:       >189 mg/dl      Non HDL Cholesterol 162 (H) <130 mg/dL      Comment:      Above Desirable:  130-159 mg/dl  Borderline high:  160-189 mg/dl  High:             190-219 mg/dl  Very high:       >219 mg/dl     Basic metabolic panel   Result Value Ref Range    Sodium 141 133 - 144 mmol/L    Potassium 4.4 3.4 - 5.3 mmol/L    Chloride 110 (H) 94 - 109 mmol/L    Carbon Dioxide 25 20 - 32 mmol/L    Anion Gap 6 3 - 14 mmol/L    Glucose 94 70 - 99 mg/dL    Urea Nitrogen 15 7 - 30 mg/dL    Creatinine 0.81 0.66 - 1.25 mg/dL    GFR Estimate >90 >60 mL/min/[1.73_m2]      Comment:      Non  GFR Calc  Starting 12/18/2018, serum creatinine based estimated GFR (eGFR) will be   calculated using the Chronic Kidney Disease Epidemiology Collaboration   (CKD-EPI) equation.      GFR Estimate If Black >90 >60 mL/min/[1.73_m2]      Comment:       GFR Calc  Starting 12/18/2018, serum creatinine based estimated GFR (eGFR) will be   calculated using the Chronic Kidney Disease Epidemiology Collaboration   (CKD-EPI) equation.      Calcium 8.9 8.5 - 10.1 mg/dL   PSA, screen   Result  Value Ref Range    PSA 1.54 0 - 4 ug/L      Comment:      Assay Method:  Chemiluminescence using Siemens Vista analyzer       If you have any questions or concerns, please call the clinic at the number listed above.       Sincerely,        Roland Kearns MD

## 2020-03-25 ENCOUNTER — VIRTUAL VISIT (OUTPATIENT)
Dept: FAMILY MEDICINE | Facility: OTHER | Age: 70
End: 2020-03-25

## 2020-03-26 NOTE — PROGRESS NOTES
"Date: 2020 08:36:57  Clinician: Valerie Potter  Clinician NPI: [id number]  Patient: Dominic Silva  Patient : 1950  Patient Address: [address] [place] [address]  Patient Phone: [phone]  Visit Protocol: URI  Patient Summary:  Dominic is a 69 year old ( : 1950 ) male who initiated a Visit for COVID-19 (Coronavirus) evaluation and screening. When asked the question \"Please sign me up to receive news, health information and promotions from [employer].\", Dominic responded \"No\".    Dominic states his symptoms started 1-2 days ago.   His symptoms consist of malaise, a sore throat, a cough, and nasal congestion. He is experiencing mild difficulty breathing with activities but can speak normally in full sentences.   Symptom details     Nasal secretions: The color of his mucus is clear.    Cough: Dominic coughs every 5-10 minutes and his cough is not more bothersome at night. Phlegm does not come into his throat when he coughs. He does not believe his cough is caused by post-nasal drip.     Sore throat: Dominic reports having mild throat pain (1-3 on a 10 point pain scale), does not have exudate on his tonsils, and can swallow liquids. He is not sure if the lymph nodes in his neck are enlarged. A rash has not appeared on the skin since the sore throat started.      Dominic denies having chills, ear pain, rhinitis, teeth pain, headache, fever, facial pain or pressure, myalgias, and wheezing. He also denies taking antibiotic medication for the symptoms and having recent facial or sinus surgery in the past 60 days.   Precipitating events  Within the past week, Dominic has not been exposed to someone with strep throat. He has not recently been exposed to someone with influenza. Dominic has not been in close contact with any high risk individuals.   Pertinent COVID-19 (Coronavirus) information  Dominic has not traveled internationally or to the areas where COVID-19 (Coronavirus) is widespread, " including cruise ship travel in the last 14 days before the start of his symptoms.   Dominic has not had a close contact with a laboratory-confirmed COVID-19 patient within 14 days of symptom onset. He also has not had a close contact with a suspected COVID-19 patient within 14 days of symptom onset.   Dominic is not a healthcare worker or a  and does not work in a healthcare facility. He is not a family member of a healthcare worker and does not live with someone who is a healthcare worker.   Pertinent medical history  Dominic does not need a return to work/school note.   Weight: 217 lbs   Dominic does not smoke or use smokeless tobacco.   Additional information as reported by the patient (free text): Temp. yesterday was 98.6, today temp. is 99.1   Weight: 217 lbs    MEDICATIONS: simvastatin oral, ALLERGIES: NKDA  Clinician Response:  Dear Dominic,      Based on the information you have provided, you do have symptoms that are consistent with Coronavirus (COVID-19).   The coronavirus causes mild to severe respiratory illness with the most common symptoms including fever, cough and difficulty breathing. Unfortunately, many viruses cause similar symptoms and it can be difficult to distinguish between viruses, especially in mild cases, so we are presuming that anyone with cough or fever has coronavirus at this time.  Coronavirus/COVID-19 has reached the point of community spread in [place], meaning that we are finding the virus in people with no known exposure risk for kurt the virus. Given the increasing commonness of coronavirus in the community we are no longer testing patients who are not critically ill.  If you are a health care worker, you should refer to your employee health office for instructions about testing and returning to work.  For everyone else who has cough or fever, you should assume you are infected with coronavirus. Since you will not be tested but have symptoms that may be  consistent with coronavirus, the CDC recommends you stay in self-isolation until these three things have happened:    You have had no fever for at least 72 hours (that is three full days of no fever without the use of medicine that reduces fevers)    AND   Other symptoms have improved (for example, when your cough or shortness of breath have improved)   AND   At least 7 days have passed since your symptoms first appeared.   How to Isolate:    Isolate yourself at home.   Do Not allow any visitors  Do Not go to work or school  Do Not go to [employer],  centers, shopping, or other public places.  Do Not shake hands.  Avoid close contact with others (hugging, kissing).   Protect Others:    Cover Your Mouth and Nose with a mask, disposable tissue or wash cloth to avoid spreading germs to others.  Wash your hands and face frequently with soap and water.   Managing Symptoms:    At this time, we primarily recommend Tylenol (Acetaminophen) for fever or pain. If you have liver or kidney problems, contact your primary care provider for instructions on use of tylenol. Adults can take 650 mg (two 325 mg pills) by mouth every 4-6 hours as needed OR 1,000 mg (two 500 mg pills) every 8 hours as needed. MAXIMUM DAILY DOSE: 3,000mg. For children, refer to dosing on bottle based on age or weight.   If you develop significant shortness of breath that prevents you from doing normal activities, please call 911 or proceed to the nearest emergency room and alert them immediately that you have been in self-isolation for possible coronavirus.   For more information about COVID19 and options for caring for yourself at home, please visit the CDC website at https://www.cdc.gov/coronavirus/2019-ncov/about/steps-when-sick.htmlFor more options for care at [facility], please visit our website at [email or web address]     Diagnosis: Cough  Diagnosis ICD: R05

## 2020-04-18 ENCOUNTER — NURSE TRIAGE (OUTPATIENT)
Dept: NURSING | Facility: CLINIC | Age: 70
End: 2020-04-18

## 2020-04-18 NOTE — TELEPHONE ENCOUNTER
"Patient calling \"I just pulled a aayush tick off of my husbands inner arm. I think I got all of it, I saved the tick. I think it was only on there a day. It's very small. Mildly red around it, no other sx. Triaged, gave home care advice. Follow up with PCP on Monday 4/20 as needed. Also gave sx to monitor, if these develop over the weekend advised to call back.  Katlyn Lyle RN [facility]        Additional Information    Negative: [1] Fever AND [2] red area    Negative: [1] Fever AND [2] area is very tender to touch    Negative: [1] Red streak or red line AND [2] length > 2 inches (5 cm)    Negative: Can't remove live tick (after trying Care Advice)    Negative: Can't remove tick's head that was broken off in the skin (after trying Care Advice)    Negative: [1] 2 to 14 days following tick bite AND [2] fever AND [3] no rash or headache    Negative: [1] 2 to 14 days following tick bite AND [2] widespread rash or headache AND [3] no fever    Negative: [1] Red or very tender (to touch) area AND [2] started over 24 hours after the bite    Negative: Red ring or bull's-eye rash occurs at tick bite    Negative: [1] Probable deer tick AND [2] attached > 36 hours (or tick appears swollen, not flat) AND     [3] occurred in an area where Lyme disease is common    Negative: [1] Scab is present AND [2] it drains pus or increases in size AND [3] not improved after applying antibiotic ointment for 2 days    Negative: [1] Scab is present AND [2] it drains pus or increases in size    Negative: Tick bite with no complications    Prevention of tick bites and insect repellents (e.g., DEET), questions about    Protocols used: TICK BITE-A-AH      "

## 2020-06-10 ENCOUNTER — VIRTUAL VISIT (OUTPATIENT)
Dept: FAMILY MEDICINE | Facility: CLINIC | Age: 70
End: 2020-06-10
Payer: MEDICARE

## 2020-06-10 DIAGNOSIS — M75.51 BURSITIS OF RIGHT SHOULDER: Primary | ICD-10-CM

## 2020-06-10 PROCEDURE — 99441 ZZC PHYSICIAN TELEPHONE EVALUATION 5-10 MIN: CPT | Performed by: FAMILY MEDICINE

## 2020-06-10 NOTE — PROGRESS NOTES
"Dominic Silva is a 70 year old male who is being evaluated via a billable telephone visit.      The patient has been notified of following:     \"This telephone visit will be conducted via a call between you and your physician/provider. We have found that certain health care needs can be provided without the need for a physical exam.  This service lets us provide the care you need with a short phone conversation.  If a prescription is necessary we can send it directly to your pharmacy.  If lab work is needed we can place an order for that and you can then stop by our lab to have the test done at a later time.    Telephone visits are billed at different rates depending on your insurance coverage. During this emergency period, for some insurers they may be billed the same as an in-person visit.  Please reach out to your insurance provider with any questions.    If during the course of the call the physician/provider feels a telephone visit is not appropriate, you will not be charged for this service.\"    Patient has given verbal consent for Telephone visit?  Yes    What phone number would you like to be contacted at? [phone]    How would you like to obtain your AVS?     Subjective     Dominic Silva is a 70 year old male who presents via phone visit today for the following health issues:    HPI  Joint Pain    Onset: \"a while\"    Description:   Location: right shoulder  Character: Dull ache    Intensity: moderate    Progression of Symptoms: same    Accompanying Signs & Symptoms:  Other symptoms: radiation of pain to arm    History:   Previous similar pain: YES      Precipitating factors:   Trauma or overuse: no     Alleviating factors:  Improved by: nothing    Therapies Tried and outcome: ibuprofen, tylenol      Prior history of PMR, osteoarthritis and bilateral shoulder bursitis, R>L. Prior PT evaluation 1 year ago and continues doing HEP.     Patient Active Problem List   Diagnosis     HYPERLIPIDEMIA LDL " GOAL <130     Advanced directives, counseling/discussion     PMR (polymyalgia rheumatica) (H)     Acquired elevated diaphragm     Cervical spondylosis without myelopathy     Past Surgical History:   Procedure Laterality Date     COLONOSCOPY N/A 4/13/2015    Procedure: COLONOSCOPY;  Surgeon: Ajay Amador MD;  Location: [facility]     HC AMPUTATE FINGER/THUMB,SINGLE W/WO INTER TFR      2 fingers     HC COLONOSCOPY THRU STOMA, DIAGNOSTIC  2005    Normal-repeat 2015       Social History     Tobacco Use     Smoking status: Never Smoker     Smokeless tobacco: Never Used   Substance Use Topics     Alcohol use: Yes     Comment: occasional, twice a month     Family History   Problem Relation Age of Onset     Cardiovascular Father         pacemaker, CABG     Respiratory Father      Gastrointestinal Disease Father         ulcers several years ago     Alzheimer Disease Mother         undiagnosed--has symptoms     Lipids Mother      Eye Disorder Mother         cataract     Lipids Brother      Unknown/Adopted Brother      Myocardial Infarction Brother      Coronary Artery Disease Brother      Myocardial Infarction Sister      Coronary Artery Disease Sister      Unknown/Adopted Brother      Heart Disease Brother      Coronary Artery Disease Brother      Heart Disease Sister         pacemaker     Pacemaker Sister      C.A.D. Brother         bypass         Current Outpatient Medications   Medication Sig Dispense Refill     IBUPROFEN PO Take 200 mg by mouth       simvastatin (ZOCOR) 40 MG tablet TAKE 1 TABLET BY MOUTH DAILY AT BEDTIME 90 tablet 3     ASPIRIN PO        Allergies   Allergen Reactions     No Known Drug Allergies      Recent Labs   Lab Test 09/13/19  1028 08/27/18  0756 09/13/17  0858 08/05/16  1335  04/17/13  1642 12/17/12  1412   * 119* 145*  --    < >  --   --    HDL 51 44 47  --    < >  --   --    TRIG 191* 203* 128  --    < >  --   --    ALT  --   --   --  28  --   --  40   CR 0.81 0.85  --   --    < > 0.82  0.84   GFRESTIMATED >90 90  --   --    < > >90 >90   GFRESTBLACK >90 >90  --   --    < > >90 >90   POTASSIUM 4.4 4.2  --   --    < > 4.9 4.6   TSH  --   --   --  1.81  --  0.83  --     < > = values in this interval not displayed.      BP Readings from Last 3 Encounters:   09/13/19 116/70   04/03/19 118/68   12/27/18 121/84    Wt Readings from Last 3 Encounters:   09/13/19 99.8 kg (220 lb)   04/03/19 99.4 kg (219 lb 1 oz)   08/27/18 98.3 kg (216 lb 12.8 oz)                    Reviewed and updated as needed this visit by Provider  Tobacco  Allergies  Meds  Problems  Med Hx  Surg Hx  Fam Hx         Review of Systems   CONSTITUTIONAL: NEGATIVE for fever, chills, change in weight  ENT/MOUTH: NEGATIVE for ear, mouth and throat problems  RESP: NEGATIVE for significant cough or SOB  CV: NEGATIVE for chest pain, palpitations or peripheral edema  MUSCULOSKELETAL: POSITIVE  for joint pain bilateral shoulder, subacromial pain, R>L. Right more consistent and chronic, left more recent and intermittent.  ROS otherwise negative       Objective   Reported vitals:  There were no vitals taken for this visit.   healthy, alert and no distress  PSYCH: Alert and oriented times 3; coherent speech, normal   rate and volume, able to articulate logical thoughts, able   to abstract reason, no tangential thoughts, no hallucinations   or delusions  His affect is normal  RESP: No cough, no audible wheezing, able to talk in full sentences  Remainder of exam unable to be completed due to telephone visits    Labs reviewed in [facility]  Office Visit on 09/13/2019   Component Date Value Ref Range Status     Cholesterol 09/13/2019 213* <200 mg/dL Final    Desirable:       <200 mg/dl     Triglycerides 09/13/2019 191* <150 mg/dL Final    Comment: Borderline high:  150-199 mg/dl  High:             200-499 mg/dl  Very high:       >499 mg/dl       HDL Cholesterol 09/13/2019 51  >39 mg/dL Final     LDL Cholesterol Calculated 09/13/2019 124* <100 mg/dL  Final    Comment: Above desirable:  100-129 mg/dl  Borderline High:  130-159 mg/dL  High:             160-189 mg/dL  Very high:       >189 mg/dl       Non HDL Cholesterol 09/13/2019 162* <130 mg/dL Final    Comment: Above Desirable:  130-159 mg/dl  Borderline high:  160-189 mg/dl  High:             190-219 mg/dl  Very high:       >219 mg/dl       Sodium 09/13/2019 141  133 - 144 mmol/L Final     Potassium 09/13/2019 4.4  3.4 - 5.3 mmol/L Final     Chloride 09/13/2019 110* 94 - 109 mmol/L Final     Carbon Dioxide 09/13/2019 25  20 - 32 mmol/L Final     Anion Gap 09/13/2019 6  3 - 14 mmol/L Final     Glucose 09/13/2019 94  70 - 99 mg/dL Final     Urea Nitrogen 09/13/2019 15  7 - 30 mg/dL Final     Creatinine 09/13/2019 0.81  0.66 - 1.25 mg/dL Final     GFR Estimate 09/13/2019 >90  >60 mL/min/[1.73_m2] Final    Comment: Non  GFR Calc  Starting 12/18/2018, serum creatinine based estimated GFR (eGFR) will be   calculated using the Chronic Kidney Disease Epidemiology Collaboration   (CKD-EPI) equation.       GFR Estimate If Black 09/13/2019 >90  >60 mL/min/[1.73_m2] Final    Comment:  GFR Calc  Starting 12/18/2018, serum creatinine based estimated GFR (eGFR) will be   calculated using the Chronic Kidney Disease Epidemiology Collaboration   (CKD-EPI) equation.       Calcium 09/13/2019 8.9  8.5 - 10.1 mg/dL Final     PSA 09/13/2019 1.54  0 - 4 ug/L Final    Assay Method:  Chemiluminescence using Siemens Vista analyzer       Diagnostic- Test Results:        Assessment/Plan:  1. Bursitis of right shoulder  Chronic, recurrent. He has previously undergone PT evaluation with some improvement. 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 Now having intermittent left shoulder symptoms as well. Will refer to [facility] for consideration for steroid injection.   Patient Instructions   Take OTC Naprosyn, 2 tablets twice daily with food. Continue home exercise plan developed with PT last year. Follow up  with [facility] Vill[facility] in 1-2 weeks.      - Orthopedic & Spine  Referral; Future    Return in about 1 week (around 6/17/2020) for [facility] for right shoulder burstitis.      Phone call duration:  9 minutes    Roland Kearns MD

## 2020-06-15 ENCOUNTER — OFFICE VISIT (OUTPATIENT)
Dept: ORTHOPEDICS | Facility: CLINIC | Age: 70
End: 2020-06-15
Payer: MEDICARE

## 2020-06-15 ENCOUNTER — ANCILLARY PROCEDURE (OUTPATIENT)
Dept: GENERAL RADIOLOGY | Facility: CLINIC | Age: 70
End: 2020-06-15
Attending: PHYSICAL MEDICINE & REHABILITATION
Payer: MEDICARE

## 2020-06-15 VITALS
BODY MASS INDEX: 30.52 KG/M2 | SYSTOLIC BLOOD PRESSURE: 126 MMHG | HEIGHT: 71 IN | WEIGHT: 218 LBS | DIASTOLIC BLOOD PRESSURE: 76 MMHG

## 2020-06-15 DIAGNOSIS — M25.511 BILATERAL SHOULDER PAIN: ICD-10-CM

## 2020-06-15 DIAGNOSIS — M25.512 CHRONIC PAIN OF BOTH SHOULDERS: Primary | ICD-10-CM

## 2020-06-15 DIAGNOSIS — M25.511 CHRONIC PAIN OF BOTH SHOULDERS: Primary | ICD-10-CM

## 2020-06-15 DIAGNOSIS — M19.012 PRIMARY OSTEOARTHRITIS OF BOTH SHOULDERS: ICD-10-CM

## 2020-06-15 DIAGNOSIS — M19.011 PRIMARY OSTEOARTHRITIS OF BOTH SHOULDERS: ICD-10-CM

## 2020-06-15 DIAGNOSIS — M25.512 BILATERAL SHOULDER PAIN: ICD-10-CM

## 2020-06-15 DIAGNOSIS — G89.29 CHRONIC PAIN OF BOTH SHOULDERS: Primary | ICD-10-CM

## 2020-06-15 PROCEDURE — 20610 DRAIN/INJ JOINT/BURSA W/O US: CPT | Mod: RT | Performed by: PHYSICAL MEDICINE & REHABILITATION

## 2020-06-15 PROCEDURE — 73030 X-RAY EXAM OF SHOULDER: CPT | Mod: TC

## 2020-06-15 PROCEDURE — 99204 OFFICE O/P NEW MOD 45 MIN: CPT | Mod: 25 | Performed by: PHYSICAL MEDICINE & REHABILITATION

## 2020-06-15 RX ORDER — TRIAMCINOLONE ACETONIDE 40 MG/ML
40 INJECTION, SUSPENSION INTRA-ARTICULAR; INTRAMUSCULAR
Status: SHIPPED | OUTPATIENT
Start: 2020-06-15

## 2020-06-15 RX ADMIN — TRIAMCINOLONE ACETONIDE 40 MG: 40 INJECTION, SUSPENSION INTRA-ARTICULAR; INTRAMUSCULAR at 15:52

## 2020-06-15 ASSESSMENT — MIFFLIN-ST. JEOR: SCORE: 1781.97

## 2020-06-15 NOTE — PATIENT INSTRUCTIONS
-Steroid injection performed today.  Take it easy over the next few days. Keep in mind that the steroid may take up to 3 days to start working and up to 2 weeks to reach maximal effect.  Ice 15-20 minutes as needed for soreness.  Patient's preferred over the counter medication as needed for pain as directed on packaging.  -Continue home exercises.    -Also discussed formal physical therapy    -Follow up as needed if symptoms fail to improve or worsen.  Please call with questions or concerns.

## 2020-06-15 NOTE — PROGRESS NOTES
Sports Medicine Clinic Visit    PCP: Roland Kearns    CC: Patient presents with:  Right Shoulder - Pain      HPI:  Dominic Silva is a 70 year old male who is seen in consultation at the request of Dr. Kearns.   He notes right shoulder pain that has been bothering him for years.  He also notes recently the left shoulder has started to bother him.  He notes the right shoulder pain is beginning to go down the arm. He rates the pain at a 8/10 at its worst and a 7/10 currently.  Symptoms are relieved with CBD lotion helps somewhat. Symptoms are worsened by certain motions, overhead motions. He endorses tingling with certain positions.   He denies neck pain, popping, grinding and weakness.  Other treatment has included cold compresses, Tylenol and ibuprofen.       Review of Systems:  Musculoskeletal: as above  Remainder of review of systems is negative including constitutional, eyes, ENT, CV, pulmonary, GI, , endocrine, skin, hematologic, and neurologic except as noted in HPI or medical history.    History reviewed. No pertinent past surgical/medical/family/social history other than as mentioned in HPI.    Patient Active Problem List   Diagnosis     HYPERLIPIDEMIA LDL GOAL <130     Advanced directives, counseling/discussion     PMR (polymyalgia rheumatica) (H)     Acquired elevated diaphragm     Cervical spondylosis without myelopathy     Past Medical History:   Diagnosis Date     Cervical herniated disc 3/2012    EDSI     Other and unspecified hyperlipidemia      Other symptoms referable to back     low back pain     Past Surgical History:   Procedure Laterality Date     COLONOSCOPY N/A 4/13/2015    Procedure: COLONOSCOPY;  Surgeon: Ajay Amador MD;  Location: [facility]     HC AMPUTATE FINGER/THUMB,SINGLE W/WO INTER TFR      2 fingers     HC COLONOSCOPY THRU STOMA, DIAGNOSTIC  2005    Normal-repeat 2015     Family History   Problem Relation Age of Onset     Cardiovascular Father         pacemaker, CABG      Respiratory Father      Gastrointestinal Disease Father         ulcers several years ago     Alzheimer Disease Mother         undiagnosed--has symptoms     Lipids Mother      Eye Disorder Mother         cataract     Lipids Brother      Unknown/Adopted Brother      Myocardial Infarction Brother      Coronary Artery Disease Brother      Myocardial Infarction Sister      Coronary Artery Disease Sister      Unknown/Adopted Brother      Heart Disease Brother      Coronary Artery Disease Brother      Heart Disease Sister         pacemaker     Pacemaker Sister      C.A.D. 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, Hog and crop   Social Needs     Financial resource strain: Not on file     Food insecurity     Worry: Not on file     Inability: Not on file     Transportation needs     Medical: Not on file     Non-medical: Not on file   Tobacco Use     Smoking status: Never Smoker     Smokeless tobacco: Never Used   Substance and Sexual Activity     Alcohol use: Yes     Comment: occasional, twice a month     Drug use: No     Sexual activity: Yes     Partners: Female   Lifestyle     Physical activity     Days per week: Not on file     Minutes per session: Not on file     Stress: Not on file   Relationships     Social connections     Talks on phone: Not on file     Gets together: Not on file     Attends Episcopalian service: Not on file     Active member of club or organization: Not on file     Attends meetings of clubs or organizations: Not on file     Relationship status: Not on file     Intimate partner violence     Fear of current or ex partner: Not on file     Emotionally  "abused: Not on file     Physically abused: Not on file     Forced sexual activity: Not on file   Other Topics Concern      Service No     Blood Transfusions No     Caffeine Concern No     Comment: coffee 3 cups/day    Soda 2 cans/day     Occupational Exposure No     Comment: concrete dust/ready mix/ lifting, 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 Not Asked   Social History Narrative     Not on file       He is retired.     Current Outpatient Medications   Medication     simvastatin (ZOCOR) 40 MG tablet     ASPIRIN PO     IBUPROFEN PO     No current facility-administered medications for this visit.      Allergies   Allergen Reactions     No Known Drug Allergies          Objective:  /76   Ht 1.805 m (5' 11.06\")   Wt 98.9 kg (218 lb)   BMI 30.35 kg/m      General: Alert and in no distress    Head: Normocephalic, atraumatic  Eyes: no scleral icterus or conjunctival erythema   Skin: no erythema, petechiae, or jaundice  CV: regular rhythm by palpation, 2+ distal pulses  Resp: normal respiratory effort without conversational dyspnea   Psych: normal mood and affect    Neuro: Dysarthric speech    Musculoskeletal:    Bilateral Shoulder exam    Inspection and Posture:       Rounded shoulders and upper back    Palpation:  -No tenderness to palpation over the bilateral shoulders    ROM:        Brings arms forward with right shoulder abduction.  Decreased right shoulder external rotation.  Right shoulder abduction, flexion, and external rotation are painful.      Strength: "        shoulder abduction 5/5 bilaterally       shoulder flexion 5/5 bilaterally        strength 5/5 bilaterally    Sensation:        normal sensation over shoulder and upper extremity       Radiology:  X-rays ordered and independent visualization of images performed and reviewed with Dominic and his wife, Nina.    Recent Results (from the past 744 hour(s))   XR Shoulder 3 View Bilateral    Narrative    SHOULDER THREE VIEWS BILATERAL   6/15/2020 2:47 PM     HISTORY:  Bilateral shoulder pain.    COMPARISON: Left shoulder 2/18/2006      Impression    IMPRESSION:   1. Left: Moderate glenohumeral osteoarthritis, significantly  increased. There is a very small calcific focus adjacent to the  greater tuberosity which could represent minimal rotator cuff region  calcific tendinitis/bursitis  2. Right: Prominent glenohumeral osteoarthritis. There is marked joint  space narrowing inferiorly. There is some inferior translation of the  humeral head and humeral head remodeling.       Large Joint Injection/Arthocentesis: R subacromial bursa    Date/Time: 6/15/2020 3:52 PM  Performed by: Susy Villaseñor MD  Authorized by: Susy Villaseñor MD     Indications:  Pain  Needle Size:  25 G  Guidance: landmark guided    Approach:  Posterior  Location:  Shoulder      Site:  R subacromial bursa  Medications:  40 mg triamcinolone 40 MG/ML  Medications comment:  4ml 0.5% bupivicaine  NDC:28817-349-09  Lot: [id number]  1/31/21        Outcome:  Tolerated well, no immediate complications  Procedure discussed: discussed risks, benefits, and alternatives    Consent Given by:  Patient          Assessment:  1. Chronic pain of both shoulders    2. Primary osteoarthritis of both shoulders        Plan:  Discussed the assessment with the patient and developed a plan together:  -Steroid injection performed today.  Take it easy over the next few days. Keep in mind that the steroid may take up to 3 days to start working and up  to 2 weeks to reach maximal effect.  Ice 15-20 minutes as needed for soreness.  Patient's preferred over the counter medication as needed for pain as directed on packaging.    -Continue home exercises.    -Follow up as needed if symptoms fail to improve or worsen.  Please call with questions or concerns.      Prema Villaseñor MD, CAQ Sports Medicine  [facility]

## 2020-06-15 NOTE — LETTER
6/15/2020         RE: Dominic Silva  [address] [address] [place] [address]        Dear Colleague,    Thank you for referring your patient, Dominic Silva, to the [facility]. Please see a copy of my visit note below.    Sports Medicine Clinic Visit    PCP: Roland Kearns    CC: Patient presents with:  Right Shoulder - Pain      HPI:  Dominic Silva is a 70 year old male who is seen in consultation at the request of Dr. Kearns.   He notes right shoulder pain that has been bothering him for years.  He also notes recently the left shoulder has started to bother him.  He notes the right shoulder pain is beginning to go down the arm. He rates the pain at a 8/10 at its worst and a 7/10 currently.  Symptoms are relieved with CBD lotion helps somewhat. Symptoms are worsened by certain motions, overhead motions. He endorses tingling with certain positions.   He denies neck pain, popping, grinding and weakness.  Other treatment has included cold compresses, Tylenol and ibuprofen.       Review of Systems:  Musculoskeletal: as above  Remainder of review of systems is negative including constitutional, eyes, ENT, CV, pulmonary, GI, , endocrine, skin, hematologic, and neurologic except as noted in HPI or medical history.    History reviewed. No pertinent past surgical/medical/family/social history other than as mentioned in HPI.    Patient Active Problem List   Diagnosis     HYPERLIPIDEMIA LDL GOAL <130     Advanced directives, counseling/discussion     PMR (polymyalgia rheumatica) (H)     Acquired elevated diaphragm     Cervical spondylosis without myelopathy     Past Medical History:   Diagnosis Date     Cervical herniated disc 3/2012    EDSI     Other and unspecified hyperlipidemia      Other symptoms referable to back     low back pain     Past Surgical History:   Procedure Laterality Date     COLONOSCOPY N/A 4/13/2015    Procedure: COLONOSCOPY;  Surgeon: Ajay Amador MD;  Location: [facility]  AMPUTATE FINGER/THUMB,SINGLE W/WO INTER TFR      2 fingers     HC COLONOSCOPY THRU STOMA, DIAGNOSTIC  2005    Normal-repeat 2015     Family History   Problem Relation Age of Onset     Cardiovascular Father         pacemaker, CABG     Respiratory Father      Gastrointestinal Disease Father         ulcers several years ago     Alzheimer Disease Mother         undiagnosed--has symptoms     Lipids Mother      Eye Disorder Mother         cataract     Lipids Brother      Unknown/Adopted Brother      Myocardial Infarction Brother      Coronary Artery Disease Brother      Myocardial Infarction Sister      Coronary Artery Disease Sister      Unknown/Adopted Brother      Heart Disease Brother      Coronary Artery Disease Brother      Heart Disease Sister         pacemaker     Pacemaker Sister      C.A.D. 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, Hog and crop   Social Needs     Financial resource strain: Not on file     Food insecurity     Worry: Not on file     Inability: Not on file     Transportation needs     Medical: Not on file     Non-medical: Not on file   Tobacco Use     Smoking status: Never Smoker     Smokeless tobacco: Never Used   Substance and Sexual Activity     Alcohol use: Yes     Comment: occasional, twice a month     Drug use: No     Sexual activity: Yes     Partners: Female   Lifestyle     Physical activity     Days per week: Not on file     Minutes per session: Not on file     Stress: Not on file   Relationships     Social connections     Talks on phone: Not on file     Gets together: Not on file     Attends Denominational service: Not on file     Active  "member of club or organization: Not on file     Attends meetings of clubs or organizations: Not on file     Relationship status: Not on file     Intimate partner violence     Fear of current or ex partner: Not on file     Emotionally abused: Not on file     Physically abused: Not on file     Forced sexual activity: Not on file   Other Topics Concern      Service No     Blood Transfusions No     Caffeine Concern No     Comment: coffee 3 cups/day    Soda 2 cans/day     Occupational Exposure No     Comment: concrete dust/ready mix/ lifting, 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 Not Asked   Social History Narrative     Not on file       He is retired.     Current Outpatient Medications   Medication     simvastatin (ZOCOR) 40 MG tablet     ASPIRIN PO     IBUPROFEN PO     No current facility-administered medications for this visit.      Allergies   Allergen Reactions     No Known Drug Allergies          Objective:  /76   Ht 1.805 m (5' 11.06\")   Wt 98.9 kg (218 lb)   BMI 30.35 kg/m      General: Alert and in no distress    Head: Normocephalic, atraumatic  Eyes: no scleral icterus or conjunctival erythema   Skin: no erythema, petechiae, or jaundice  CV: regular rhythm by palpation, 2+ distal pulses  Resp: normal respiratory effort without conversational dyspnea   Psych: normal mood and affect    Neuro: Dysarthric speech    Musculoskeletal:    Bilateral Shoulder exam    Inspection and Posture:       Rounded shoulders and upper back    Palpation:  -No tenderness to " palpation over the bilateral shoulders    ROM:        Brings arms forward with right shoulder abduction.  Decreased right shoulder external rotation.  Right shoulder abduction, flexion, and external rotation are painful.      Strength:        shoulder abduction 5/5 bilaterally       shoulder flexion 5/5 bilaterally        strength 5/5 bilaterally    Sensation:        normal sensation over shoulder and upper extremity       Radiology:  X-rays ordered and independent visualization of images performed and reviewed with Dominic and his wife, Nina.    Recent Results (from the past 744 hour(s))   XR Shoulder 3 View Bilateral    Narrative    SHOULDER THREE VIEWS BILATERAL   6/15/2020 2:47 PM     HISTORY:  Bilateral shoulder pain.    COMPARISON: Left shoulder 2/18/2006      Impression    IMPRESSION:   1. Left: Moderate glenohumeral osteoarthritis, significantly  increased. There is a very small calcific focus adjacent to the  greater tuberosity which could represent minimal rotator cuff region  calcific tendinitis/bursitis  2. Right: Prominent glenohumeral osteoarthritis. There is marked joint  space narrowing inferiorly. There is some inferior translation of the  humeral head and humeral head remodeling.       Large Joint Injection/Arthocentesis: R subacromial bursa    Date/Time: 6/15/2020 3:52 PM  Performed by: Susy Villaseñor MD  Authorized by: Susy Villaseñor MD     Indications:  Pain  Needle Size:  25 G  Guidance: landmark guided    Approach:  Posterior  Location:  Shoulder      Site:  R subacromial bursa  Medications:  40 mg triamcinolone 40 MG/ML  Medications comment:  4ml 0.5% bupivicaine  NDC:94458-976-52  Lot: [id number]  1/31/21        Outcome:  Tolerated well, no immediate complications  Procedure discussed: discussed risks, benefits, and alternatives    Consent Given by:  Patient          Assessment:  1. Chronic pain of both shoulders    2. Primary osteoarthritis of both shoulders         Plan:  Discussed the assessment with the patient and developed a plan together:  -Steroid injection performed today.  Take it easy over the next few days. Keep in mind that the steroid may take up to 3 days to start working and up to 2 weeks to reach maximal effect.  Ice 15-20 minutes as needed for soreness.  Patient's preferred over the counter medication as needed for pain as directed on packaging.    -Continue home exercises.    -Follow up as needed if symptoms fail to improve or worsen.  Please call with questions or concerns.      Prema Villaseñor MD, [facility] Sports Medicine  [facility]    Again, thank you for allowing me to participate in the care of your patient.        Sincerely,        Susy Villaseñor MD

## 2020-07-09 ENCOUNTER — NURSE TRIAGE (OUTPATIENT)
Dept: NURSING | Facility: CLINIC | Age: 70
End: 2020-07-09

## 2020-07-09 ENCOUNTER — HOSPITAL ENCOUNTER (EMERGENCY)
Facility: CLINIC | Age: 70
Discharge: HOME | End: 2020-07-09
Attending: FAMILY MEDICINE | Admitting: FAMILY MEDICINE
Payer: MEDICARE

## 2020-07-09 VITALS
HEART RATE: 90 BPM | SYSTOLIC BLOOD PRESSURE: 130 MMHG | TEMPERATURE: 97.6 F | DIASTOLIC BLOOD PRESSURE: 94 MMHG | BODY MASS INDEX: 28.82 KG/M2 | RESPIRATION RATE: 18 BRPM | WEIGHT: 207 LBS | OXYGEN SATURATION: 96 %

## 2020-07-09 DIAGNOSIS — T24.201A: ICD-10-CM

## 2020-07-09 DIAGNOSIS — T24.202A: ICD-10-CM

## 2020-07-09 PROCEDURE — 25000125 ZZHC RX 250

## 2020-07-09 PROCEDURE — 99284 EMERGENCY DEPT VISIT MOD MDM: CPT | Mod: Z6 | Performed by: FAMILY MEDICINE

## 2020-07-09 PROCEDURE — 99283 EMERGENCY DEPT VISIT LOW MDM: CPT | Performed by: FAMILY MEDICINE

## 2020-07-09 RX ORDER — GINSENG 100 MG
CAPSULE ORAL
Status: COMPLETED
Start: 2020-07-09 | End: 2020-07-09

## 2020-07-09 RX ORDER — GINSENG 100 MG
CAPSULE ORAL ONCE
Status: COMPLETED | OUTPATIENT
Start: 2020-07-09 | End: 2020-07-09

## 2020-07-09 RX ORDER — CEPHALEXIN 500 MG/1
500 CAPSULE ORAL 4 TIMES DAILY
Qty: 28 CAPSULE | Refills: 0 | Status: SHIPPED | OUTPATIENT
Start: 2020-07-09 | End: 2020-07-16

## 2020-07-09 RX ORDER — OXYCODONE AND ACETAMINOPHEN 5; 325 MG/1; MG/1
TABLET ORAL
Qty: 12 TABLET | Refills: 0 | Status: SHIPPED | OUTPATIENT
Start: 2020-07-09 | End: 2020-12-03

## 2020-07-09 RX ADMIN — Medication: at 11:55

## 2020-07-09 RX ADMIN — BACITRACIN: 500 OINTMENT TOPICAL at 11:55

## 2020-07-09 NOTE — ED AVS SNAPSHOT
[facility] Emergency Department  [address] [place] [address]  Phone:  [phone]  Fax:  [phone]                                    Dominic Silva   MRN: [id number]    Department:  [facility] Emergency Department   Date of Visit:  7/9/2020           After Visit Summary Signature Page    I have received my discharge instructions, and my questions have been answered. I have discussed any challenges I see with this plan with the nurse or doctor.    ..........................................................................................................................................  Patient/Patient Representative Signature      ..........................................................................................................................................  Patient Representative Print Name and Relationship to Patient    ..................................................               ................................................  Date                                   Time    ..........................................................................................................................................  Reviewed by Signature/Title    ...................................................              ..............................................  Date                                               Time          22EPIC Rev 08/18

## 2020-07-09 NOTE — DISCHARGE INSTRUCTIONS
Apply antibiotic ointment daily.  Cool soaks with gentle washing will debride the dead skin.  You can also soak in cool water for pain control.  Take ibuprofen 800 mg 3 times a day as needed for pain.  Take Percocet 1 to 2 tablets only as needed for severe pain or at bedtime to aid sleep.  Antibiotics only if redness goes around to the backside of the leg.  Try to keep legs elevated during the day.  Avoid any sun exposure whatsoever for the rest of the summer.  Return to the ER if fever increased swelling or pus draining from any of the wounds.
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
## 2020-12-03 ENCOUNTER — OFFICE VISIT (OUTPATIENT)
Dept: FAMILY MEDICINE | Facility: CLINIC | Age: 70
End: 2020-12-03
Payer: MEDICARE

## 2020-12-03 VITALS
DIASTOLIC BLOOD PRESSURE: 84 MMHG | RESPIRATION RATE: 18 BRPM | TEMPERATURE: 97.9 F | SYSTOLIC BLOOD PRESSURE: 120 MMHG | WEIGHT: 221 LBS | HEART RATE: 90 BPM | OXYGEN SATURATION: 95 % | BODY MASS INDEX: 30.77 KG/M2

## 2020-12-03 DIAGNOSIS — M75.51 BURSITIS OF RIGHT SHOULDER: ICD-10-CM

## 2020-12-03 DIAGNOSIS — Z00.00 ENCOUNTER FOR MEDICARE ANNUAL WELLNESS EXAM: Primary | ICD-10-CM

## 2020-12-03 DIAGNOSIS — E78.2 MIXED HYPERLIPIDEMIA: ICD-10-CM

## 2020-12-03 DIAGNOSIS — M35.3 PMR (POLYMYALGIA RHEUMATICA) (H): ICD-10-CM

## 2020-12-03 LAB
CHOLEST SERPL-MCNC: 197 MG/DL
HDLC SERPL-MCNC: 45 MG/DL
LDLC SERPL CALC-MCNC: 118 MG/DL
NONHDLC SERPL-MCNC: 152 MG/DL
TRIGL SERPL-MCNC: 170 MG/DL

## 2020-12-03 PROCEDURE — G0439 PPPS, SUBSEQ VISIT: HCPCS | Performed by: FAMILY MEDICINE

## 2020-12-03 PROCEDURE — 80061 LIPID PANEL: CPT | Performed by: FAMILY MEDICINE

## 2020-12-03 PROCEDURE — 36415 COLL VENOUS BLD VENIPUNCTURE: CPT | Performed by: FAMILY MEDICINE

## 2020-12-03 PROCEDURE — 99213 OFFICE O/P EST LOW 20 MIN: CPT | Mod: 25 | Performed by: FAMILY MEDICINE

## 2020-12-03 RX ORDER — SIMVASTATIN 40 MG
TABLET ORAL
Qty: 90 TABLET | Refills: 4 | Status: SHIPPED | OUTPATIENT
Start: 2020-12-03

## 2020-12-03 ASSESSMENT — PAIN SCALES - GENERAL: PAINLEVEL: SEVERE PAIN (7)

## 2020-12-03 ASSESSMENT — ACTIVITIES OF DAILY LIVING (ADL): CURRENT_FUNCTION: NO ASSISTANCE NEEDED

## 2020-12-03 NOTE — PROGRESS NOTES
"SUBJECTIVE:   Dominic Silva is a 70 year old male who presents for Preventive Visit.      Patient has been advised of split billing requirements and indicates understanding: Yes   Are you in the first 12 months of your Medicare coverage?  No    Healthy Habits:     In general, how would you rate your overall health?  Very good    Frequency of exercise:  None (doing a lot of outside work)    Do you usually eat at least 4 servings of fruit and vegetables a day, include whole grains    & fiber and avoid regularly eating high fat or \"junk\" foods?  Yes    Taking medications regularly:  Yes    Barriers to taking medications:  None    Medication side effects:  None    Ability to successfully perform activities of daily living:  No assistance needed    Home Safety:  No safety concerns identified    Hearing Impairment:  No hearing concerns    In the past 6 months, have you been bothered by leaking of urine?  No    In general, how would you rate your overall mental or emotional health?  Very good      PHQ-2 Total Score: 0    Additional concerns today:  Yes (right shoulder )    Do you feel safe in your environment? Yes    Have you ever done Advance Care Planning? (For example, a Health Directive, POLST, or a discussion with a medical provider or your loved ones about your wishes): No, advance care planning information given to patient to review.  Patient declined advance care planning discussion at this time.      Fall risk  Fallen 2 or more times in the past year?: Yes  Any fall with injury in the past year?: No  Timed Up and Go Test (>13.5 is fall risk; contact physician) : 12    Cognitive Screening   1) Repeat 3 items (Leader, Season, Table)    2) Clock draw: NORMAL  3) 3 item recall: Recalls 3 objects  Results: 3 items recalled: COGNITIVE IMPAIRMENT LESS LIKELY    Mini-CogTM Copyright ESPERANZA Ceron. Licensed by the author for use in [facility]; reprinted with permission (stacy@.[facility]). All rights reserved.  "     Do you have sleep apnea, excessive snoring or daytime drowsiness?: no    Reviewed and updated as needed this visit by clinical staff  Tobacco  Allergies  Meds  Problems  Med Hx  Surg Hx  Fam Hx  Soc Hx          Reviewed and updated as needed this visit by Provider  Tobacco  Allergies  Meds  Problems  Med Hx  Surg Hx  Fam Hx         Social History     Tobacco Use     Smoking status: Never Smoker     Smokeless tobacco: Never Used   Substance Use Topics     Alcohol use: Yes     Comment: occasional, twice a month     If you drink alcohol do you typically have >3 drinks per day or >7 drinks per week? No    Alcohol Use 12/3/2020   Prescreen: >3 drinks/day or >7 drinks/week? No               Current providers sharing in care for this patient include:   Patient Care Team:  Roland Kearns MD as PCP - General  Roland Kearns MD as Assigned PCP  Susy Villaseñor MD as Assigned Musculoskeletal Provider    The following health maintenance items are reviewed in Epic and correct as of today:  Health Maintenance   Topic Date Due     ZOSTER IMMUNIZATION (1 of 2) 04/01/2002     Pneumococcal Vaccine: 65+ Years (1 of 1 - PPSV23) 04/01/2017     DTAP/TDAP/TD IMMUNIZATION (2 - Td) 04/29/2019     INFLUENZA VACCINE (1) 09/01/2020     LIPID  09/13/2020     MEDICARE ANNUAL WELLNESS VISIT  12/03/2021     FALL RISK ASSESSMENT  12/03/2021     COLORECTAL CANCER SCREENING  04/13/2025     ADVANCE CARE PLANNING  12/03/2025     HEPATITIS C SCREENING  Completed     PHQ-2  Completed     AORTIC ANEURYSM SCREENING (SYSTEM ASSIGNED)  Completed     Pneumococcal Vaccine: Pediatrics (0 to 5 Years) and At-Risk Patients (6 to 64 Years)  Aged Out     IPV IMMUNIZATION  Aged Out     MENINGITIS IMMUNIZATION  Aged Out     HEPATITIS B IMMUNIZATION  Aged Out     Labs reviewed in EPIC  BP Readings from Last 3 Encounters:   12/03/20 120/84   07/09/20 (!) 130/94   06/15/20 126/76    Wt Readings from Last 3 Encounters:   12/03/20 100.2 kg  (221 lb)   07/09/20 93.9 kg (207 lb)   06/15/20 98.9 kg (218 lb)                  Patient Active Problem List   Diagnosis     HYPERLIPIDEMIA LDL GOAL <130     Advanced directives, counseling/discussion     PMR (polymyalgia rheumatica) (H)     Acquired elevated diaphragm     Cervical spondylosis without myelopathy     Past Surgical History:   Procedure Laterality Date     COLONOSCOPY N/A 4/13/2015    Procedure: COLONOSCOPY;  Surgeon: Ajay Amador MD;  Location: [facility]     HC AMPUTATE FINGER/THUMB,SINGLE W/WO INTER TFR      2 fingers     HC COLONOSCOPY THRU STOMA, DIAGNOSTIC  2005    Normal-repeat 2015       Social History     Tobacco Use     Smoking status: Never Smoker     Smokeless tobacco: Never Used   Substance Use Topics     Alcohol use: Yes     Comment: occasional, twice a month     Family History   Problem Relation Age of Onset     Cardiovascular Father         pacemaker, CABG     Respiratory Father      Gastrointestinal Disease Father         ulcers several years ago     Alzheimer Disease Mother         undiagnosed--has symptoms     Lipids Mother      Eye Disorder Mother         cataract     Lipids Brother      Unknown/Adopted Brother      Myocardial Infarction Brother      Coronary Artery Disease Brother      Myocardial Infarction Sister      Coronary Artery Disease Sister      Unknown/Adopted Brother      Heart Disease Brother      Coronary Artery Disease Brother      Heart Disease Sister         pacemaker     Pacemaker Sister      C.A.D. Brother         bypass         Current Outpatient Medications   Medication Sig Dispense Refill     ASPIRIN PO        IBUPROFEN PO Take 200 mg by mouth       simvastatin (ZOCOR) 40 MG tablet TAKE 1 TABLET BY MOUTH DAILY AT BEDTIME 90 tablet 3     Allergies   Allergen Reactions     No Known Drug Allergies      Recent Labs   Lab Test 09/13/19  1028 08/27/18  0756 09/13/17  0858 08/05/16  1335 04/17/13  1642 04/17/13  1642 12/17/12  1412   * 119* 145*  --    < >  --   " --    HDL 51 44 47  --    < >  --   --    TRIG 191* 203* 128  --    < >  --   --    ALT  --   --   --  28  --   --  40   CR 0.81 0.85  --   --    < > 0.82 0.84   GFRESTIMATED >90 90  --   --    < > >90 >90   GFRESTBLACK >90 >90  --   --    < > >90 >90   POTASSIUM 4.4 4.2  --   --    < > 4.9 4.6   TSH  --   --   --  1.81  --  0.83  --     < > = values in this interval not displayed.      Pneumonia Vaccine:Adults age 65+ who have not received previous Pneumovax (PPSV23) or PCV13 as an adult: Should first be given PCV13 AND then should be given PPSV23 6-12 months after PCV13    Review of Systems  CONSTITUTIONAL: NEGATIVE for fever, chills, change in weight  ENT/MOUTH: NEGATIVE for ear, mouth and throat problems  RESP: NEGATIVE for significant cough or SOB  CV: NEGATIVE for chest pain, palpitations or peripheral edema  MUSCULOSKELETAL: NEGATIVE for significant arthralgias or myalgia and POSITIVE  for joint pain bilateral shoulders R>L  ROS otherwise negative    OBJECTIVE:   /84 (BP Location: Right arm, Patient Position: Chair, Cuff Size: Adult Regular)   Pulse 90   Temp 97.9  F (36.6  C) (Temporal)   Resp 18   Wt 100.2 kg (221 lb)   SpO2 95%   BMI 30.77 kg/m   Estimated body mass index is 30.77 kg/m  as calculated from the following:    Height as of 6/15/20: 1.805 m (5' 11.06\").    Weight as of this encounter: 100.2 kg (221 lb).  Physical Exam  GENERAL: healthy, alert and no distress  EYES: Eyes grossly normal to inspection, PERRL and conjunctivae and sclerae normal  HENT: ear canals and TM's normal, nose and mouth without ulcers or lesions  NECK: no adenopathy, no asymmetry, masses, or scars and thyroid normal to palpation  RESP: lungs clear to auscultation - no rales, rhonchi or wheezes  CV: regular rate and rhythm, normal S1 S2, no S3 or S4, no murmur, click or rub, no peripheral edema and peripheral pulses strong  ABDOMEN: soft, nontender, no hepatosplenomegaly, no masses and bowel sounds normal  MS: " Shoulder exam shows positive impingement signs are present with pain at high arc of abduction and forward flexion on right. There is tenderness of the anterior shoulder and subacromial bursa.  SKIN: no suspicious lesions or rashes  NEURO: Normal strength and tone, mentation intact and speech normal  PSYCH: mentation appears normal, affect normal/bright    Diagnostic Test Results:  Labs reviewed in Epic    ASSESSMENT / PLAN:       ICD-10-CM    1. Encounter for Medicare annual wellness exam  Z00.00    2. Mixed hyperlipidemia  E78.2 simvastatin (ZOCOR) 40 MG tablet     Lipid panel reflex to direct LDL Non-fasting   3. Bursitis of right shoulder  M75.51 [facility]  Referral     OFFICE/OUTPT VISIT,EST,LEVL III   4. PMR (polymyalgia rheumatica) (H)  M35.3        Patient has been advised of split billing requirements and indicates understanding: Yes       3. Bursitis of right shoulder  Chronic, recurrent. Prior injection with FSOC, minimal relief. ROM is limited in both shoulders but really only symptomatic in right shoulder. He continues to use OTC topical analgesics and CBD oil. Recommend another shoulder injection followed by formal PT.   - Orthopedic & Spine  Referral; Future  - OFFICE/OUTPT VISIT,EST,LEVL III    4. PMR (polymyalgia rheumatica) (H)  Chronic stable.     COUNSELING:  Reviewed preventive health counseling, as reflected in patient instructions       Regular exercise       Healthy diet/nutrition       Vision screening       Immunizations    Declined: Influenza, Pneumococcal, Td and Zoster due to Other will obtain and local pharmacy.               Aspirin Prophylaxsis       The 10-year ASCVD risk score (Aleta PERDOMO Jr., et al., 2013) is: 14.6%    Values used to calculate the score:      Age: 70 years      Sex: Male      Is Non- : No      Diabetic: No      Tobacco smoker: No      Systolic Blood Pressure: 120 mmHg      Is BP treated: No      HDL Cholesterol: 51  "mg/dL      Total Cholesterol: 213 mg/dL    Estimated body mass index is 30.77 kg/m  as calculated from the following:    Height as of 6/15/20: 1.805 m (5' 11.06\").    Weight as of this encounter: 100.2 kg (221 lb).    Weight management plan: Discussed healthy diet and exercise guidelines    He reports that he has never smoked. He has never used smokeless tobacco.      Appropriate preventive services were discussed with this patient, including applicable screening as appropriate for cardiovascular disease, diabetes, osteopenia/osteoporosis, and glaucoma.  As appropriate for age/gender, discussed screening for colorectal cancer, prostate cancer, breast cancer, and cervical cancer. Checklist reviewing preventive services available has been given to the patient.    Reviewed patients plan of care and provided an AVS. The Basic Care Plan (routine screening as documented in Health Maintenance) for Dominic meets the Care Plan requirement. This Care Plan has been established and reviewed with the Patient.    Counseling Resources:  ATP IV Guidelines  Pooled Cohorts Equation Calculator  Breast Cancer Risk Calculator  Breast Cancer: Medication to Reduce Risk  FRAX Risk Assessment  ICSI Preventive Guidelines  Dietary Guidelines for Americans, 2010  Sajan's MyPlate  ASA Prophylaxis  Lung CA Screening    Roland Kearns MD  [facility]    Identified Health Risks:  "

## 2020-12-03 NOTE — LETTER
December 4, 2020      Dominic ERIC Shira  [address] [place] [address]        Dear ,    We are writing to inform you of your test results.    Your test results fall within the expected range(s) or remain unchanged from previous results.  Please continue with your current treatment plan.   .       Roland Kearns MD     Resulted Orders   Lipid panel reflex to direct LDL Non-fasting   Result Value Ref Range    Cholesterol 197 <200 mg/dL    Triglycerides 170 (H) <150 mg/dL      Comment:      Borderline high:  150-199 mg/dl  High:             200-499 mg/dl  Very high:       >499 mg/dl  Non Fasting      HDL Cholesterol 45 >39 mg/dL    LDL Cholesterol Calculated 118 (H) <100 mg/dL      Comment:      Above desirable:  100-129 mg/dl  Borderline High:  130-159 mg/dL  High:             160-189 mg/dL  Very high:       >189 mg/dl      Non HDL Cholesterol 152 (H) <130 mg/dL      Comment:      Above Desirable:  130-159 mg/dl  Borderline high:  160-189 mg/dl  High:             190-219 mg/dl  Very high:       >219 mg/dl         If you have any questions or concerns, please call the clinic at the number listed above.

## 2020-12-03 NOTE — PROGRESS NOTES
He is at risk for lack of exercise and has been provided with information to increase physical activity for the benefit of his well-being.  He is at risk for falling and has been provided with information to reduce the risk of falling at home.

## 2020-12-03 NOTE — PATIENT INSTRUCTIONS
Patient Education   Personalized Prevention Plan  You are due for the preventive services outlined below.  Your care team is available to assist you in scheduling these services.  If you have already completed any of these items, please share that information with your care team to update in your medical record.  Health Maintenance Due   Topic Date Due     Zoster (Shingles) Vaccine (1 of 2) 04/01/2002     Pneumococcal Vaccine (1 of 1 - PPSV23) 04/01/2017     Diptheria Tetanus Pertussis (DTAP/TDAP/TD) Vaccine (2 - Td) 04/29/2019     Flu Vaccine (1) 09/01/2020     Cholesterol Lab  09/13/2020       Exercise for a Healthier Heart     Exercise with a friend. When activity is fun, you're more likely to stick with it.   You may wonder how you can improve the health of your heart. If you re thinking about exercise, you re on the right track. You don t need to become an athlete. But you do need a certain amount of brisk exercise to help strengthen your heart. If you have been diagnosed with a heart condition, your healthcare provider may advise exercise to help stabilize your condition. To help make exercise a habit, choose safe, fun activities.   Before you start  Check with your healthcare provider before starting an exercise program. This is especially important if you have not been active for a while. It's also important if you have a long-term (chronic) health problem such as heart disease, diabetes, or obesity. Or if you are at high risk for having these problems.   Why exercise?  Exercising regularly offers many healthy rewards. It can help you do all of the following:    Improve your blood cholesterol level to help prevent further heart trouble    Lower your blood pressure to help prevent a stroke or heart attack    Control diabetes, or reduce your risk of getting this disease    Improve your heart and lung function    Reach and stay at a healthy weight    Make your muscles stronger so you can stay  active    Prevent falls and fractures by slowing the loss of bone mass (osteoporosis)    Manage stress better    Reduce your blood pressure    Improve your sense of self and your body image  Exercise tips      Ease into your routine. Set small goals. Then build on them. If you are not sure what your activity level should be, talk with your healthcare provider first before starting an exercise routine.    Exercise on most days. Aim for a total of 150 minutes (2 hours and 30 minutes) or more of moderate-intensity aerobic activity each week. Or 75 minutes (1 hour and 15 minutes) or more of vigorous-intensity aerobic activity each week. Or try for a combination of both. Moderate activity means that you breathe heavier and your heart rate increases but you can still talk. Think about doing 40 minutes of moderate exercise, 3 to 4 times a week. For best results, activity should last for about 40 minutes to lower blood pressure and cholesterol. It's OK to work up to the 40-minute period over time. Examples of moderate-intensity activity are walking 1 mile in 15 minutes. Or doing 30 to 45 minutes of yard work.    Step up your daily activity level.  Along with your exercise program, try being more active the whole day. Walk instead of drive. Or park further away so that you take more steps each day. Do more household tasks or yard work. You may not be able to meet the advised mount of physical activity. But doing some moderate- or vigorous-intensity aerobic activity can help reduce your risk for heart disease. Your healthcare provider can help you figure out what is best for you.    Choose 1 or more activities you enjoy.  Walking is one of the easiest things you can do. You can also try swimming, riding a bike, dancing, or taking an exercise class.    When to call your healthcare provider  Call your healthcare provider if you have any of these:     Chest pain or feel dizzy or lightheaded    Burning, tightness, pressure, or  heaviness in your chest, neck, shoulders, back, or arms    Abnormal shortness of breath    More joint or muscle pain    A very fast or irregular heartbeat (palpitations)  [email or web address] last reviewed this educational content on 7/1/2019 2000-2020 [employer]. [address] [place] [address]. All rights reserved. This information is not intended as a substitute for professional medical care. Always follow your healthcare professional's instructions.          Preventing Falls in the Home  An adult or child can fall for many reasons. If you are an older adult, you may fall because your reaction time slows down. Your muscles and joints may get stiff, weak, or less flexible because of illness, medicines, or a physical condition. These things can also make a child more likely to fall or be injured in a fall.  Other health problems that make falls more likely include:    Arthritis    Dizziness or lightheadedness when you get out of bed (orthostatic hypotension)    History of a stroke    Dizziness    Anemia    Certain medicines taken for mental illness or to control blood pressure.    Problems with balance or gait    Bladder or urinary problems    History of falls with or without an injury    Changes in vision (vision impairment)    Changes in thinking skills and memory (cognitive impairment)  Injuries from a fall can include broken bones, dislocated joints, internal bleeding and cuts. When these injuries are serious enough, they can make it impossible for you or a child who is injured in a fall to live on his or her ownhome.  Prevention tips  To help prevent falls and fall-related injuries, follow the tips below.   Floors  Make floors safer by doing the following:     Put nonskid pads under area rugs.    Remove throw rugs.    Replace worn floor coverings.    Tack carpets firmly to each step on carpeted stairs. Put nonskid strips on the edges of uncarpeted stairs.    Keep floors and stairs free of  clutter and cords.    Arrange furniture so there are clear pathways.    Clean up any spills right away.    Wear shoes that fit.  Bathrooms    Make bathrooms safer by doing the following:     Install grab bars in the tub or shower.    Apply nonskid strips or put a nonskid rubber mat in the tub or shower.    Sit on a bath chair to bathe.    Use bathmats with nonskid backing.  Lighting and the environment  Improve lighting in your home by doing the following:     Keep a flashlight in each room. Or put a lamp next to the bed within easy reach.    Put nightlights in the bedrooms, hallways, kitchen, and bathrooms.    Make sure all stairways have good lighting.    Take your time when going up and down stairs.    Put handrails on both sides of stairs and in walkways for more support. To prevent injury to your wrist or arm, don t use handrails to pull yourself up.    Install grab bars to pull yourself up.    Move or rearrange items that you use often. This will make them easier to find or reach.    Look at your home to find any safety hazards. Especially look at doorways, walkways, and the driveway. Remove or repair any safety problems that you find.  [employer] last reviewed this educational content on 8/1/2016 2000-2020 [employer]. [address] [place] [address], [place] [address]. All rights reserved. This information is not intended as a substitute for professional medical care. Always follow your healthcare professional's instructions.

## 2020-12-07 NOTE — PROGRESS NOTES
Sports Medicine Clinic Visit       PCP: Roland Kearns ERIC Silva is a 70 year old male who is seen in follow up for right shoulder pain.  Since last visit on 6/15/2020, Dominic has minimal relief since his last injection. Pain is over the right shoulder.  He rates the pain at a 8/10 currently.  Symptoms are relieved with ibuprofen and rest.  Symptoms are worsened by overhead motions, reaching across his body, sleeping on the affected shoulder.  He recently saw Dr. Kearns who recommended another injection and formal physical therapy.       He is retired.       Review of Systems  Musculoskeletal: as above  Remainder of review of systems is negative including constitutional, eyes, ENT, CV, pulmonary, GI, , endocrine, skin, hematologic, and neurologic except as noted in HPI or medical history.    History reviewed. No pertinent past surgical/medical/family/social history other than as mentioned in HPI.    Patient Active Problem List   Diagnosis     HYPERLIPIDEMIA LDL GOAL <130     Advanced directives, counseling/discussion     PMR (polymyalgia rheumatica) (H)     Acquired elevated diaphragm     Cervical spondylosis without myelopathy     Past Medical History:   Diagnosis Date     Cervical herniated disc 3/2012    EDSI     Other and unspecified hyperlipidemia      Other symptoms referable to back     low back pain     Past Surgical History:   Procedure Laterality Date     COLONOSCOPY N/A 4/13/2015    Procedure: COLONOSCOPY;  Surgeon: Ajay Amador MD;  Location: [facility]     HC AMPUTATE FINGER/THUMB,SINGLE W/WO INTER TFR      2 fingers     HC COLONOSCOPY THRU STOMA, DIAGNOSTIC  2005    Normal-repeat 2015     Family History   Problem Relation Age of Onset     Cardiovascular Father         pacemaker, CABG     Respiratory Father      Gastrointestinal Disease Father         ulcers several years ago     Alzheimer Disease Mother         undiagnosed--has symptoms     Lipids Mother      Eye Disorder Mother          cataract     Lipids Brother      Unknown/Adopted Brother      Myocardial Infarction Brother      Coronary Artery Disease Brother      Myocardial Infarction Sister      Coronary Artery Disease Sister      Unknown/Adopted Brother      Heart Disease Brother      Coronary Artery Disease Brother      Heart Disease Sister         pacemaker     Pacemaker Sister      C.A.D. Brother         bypass     Social History     Socioeconomic History     Marital status:      Spouse name: Nina     Number of children: 3     Years of education: 12     Highest education level: Not on file   Occupational History     Occupation: Retired     Employer: [employer]     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, Hog and crop   Social Needs     Financial resource strain: Not on file     Food insecurity     Worry: Not on file     Inability: Not on file     Transportation needs     Medical: Not on file     Non-medical: Not on file   Tobacco Use     Smoking status: Never Smoker     Smokeless tobacco: Never Used   Substance and Sexual Activity     Alcohol use: Yes     Comment: occasional, twice a month     Drug use: No     Sexual activity: Yes     Partners: Female   Lifestyle     Physical activity     Days per week: Not on file     Minutes per session: Not on file     Stress: Not on file   Relationships     Social connections     Talks on phone: Not on file     Gets together: Not on file     Attends Lutheran service: Not on file     Active member of club or organization: Not on file     Attends meetings of clubs or organizations: Not on file     Relationship status: Not on file     Intimate partner violence     Fear of current or ex partner: Not on file     Emotionally abused: Not on file     Physically abused: Not on file     Forced sexual activity: Not on file   Other Topics Concern      Service No     Blood Transfusions No     Caffeine Concern No     Comment:  "coffee 3 cups/day    Soda 2 cans/day     Occupational Exposure No     Comment: concrete dust/ready mix/ lifting, 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 Not Asked   Social History Narrative     Not on file         Current Outpatient Medications   Medication     ASPIRIN PO     IBUPROFEN PO     simvastatin (ZOCOR) 40 MG tablet     Current Facility-Administered Medications   Medication     triamcinolone (KENALOG-40) injection 40 mg     Allergies   Allergen Reactions     No Known Drug Allergies          Objective:  /74   Ht 1.805 m (5' 11.06\")   Wt 100.2 kg (221 lb)   BMI 30.77 kg/m      General: Alert and in no distress    Head: Normocephalic, atraumatic  Eyes: no scleral icterus or conjunctival erythema   Skin: no erythema, petechiae, or jaundice  Resp: normal respiratory effort without conversational dyspnea   Psych: normal mood and affect    Musculoskeletal:  -Tender over the right upper arm.  -Right shoulder ROM reduced compared to the left.    Radiology:  Independent visualization of images performed and reviewed with Dominic.    SHOULDER THREE VIEWS BILATERAL   6/15/2020 2:47 PM      HISTORY:  Bilateral shoulder pain.     COMPARISON: Left shoulder 2/18/2006                                                                      IMPRESSION:   1. Left: Moderate glenohumeral osteoarthritis, significantly  increased. There is a very small calcific focus adjacent to the  greater tuberosity which could represent minimal rotator cuff region  calcific tendinitis/bursitis  2. " Right: Prominent glenohumeral osteoarthritis. There is marked joint  space narrowing inferiorly. There is some inferior translation of the  humeral head and humeral head remodeling.     KAYCE BUTTS MD    Assessment:  1. Chronic right shoulder pain    2. Primary osteoarthritis of right shoulder        Plan:  Discussed the assessment with the patient and developed a plan together:  -Right shoulder glenohumeral joint injection steroid injection ordered with radiology.  -Ice 15-20 minutes for pain relief or swelling as needed  -Patient's preferred over the counter medication as directed on packaging as needed for pain or soreness.    -Follow up as needed if symptoms fail to improve or worsen.  Please call with questions or concerns.        Prema Villaseñor MD, CAQ Sports Medicine  [facility]

## 2020-12-08 ENCOUNTER — OFFICE VISIT (OUTPATIENT)
Dept: ORTHOPEDICS | Facility: CLINIC | Age: 70
End: 2020-12-08
Attending: FAMILY MEDICINE
Payer: MEDICARE

## 2020-12-08 VITALS
BODY MASS INDEX: 30.94 KG/M2 | SYSTOLIC BLOOD PRESSURE: 132 MMHG | HEIGHT: 71 IN | DIASTOLIC BLOOD PRESSURE: 74 MMHG | WEIGHT: 221 LBS

## 2020-12-08 DIAGNOSIS — Z11.59 ENCOUNTER FOR SCREENING FOR OTHER VIRAL DISEASES: Primary | ICD-10-CM

## 2020-12-08 DIAGNOSIS — M19.011 PRIMARY OSTEOARTHRITIS OF RIGHT SHOULDER: ICD-10-CM

## 2020-12-08 DIAGNOSIS — M25.511 CHRONIC RIGHT SHOULDER PAIN: Primary | ICD-10-CM

## 2020-12-08 DIAGNOSIS — G89.29 CHRONIC RIGHT SHOULDER PAIN: Primary | ICD-10-CM

## 2020-12-08 PROCEDURE — 99213 OFFICE O/P EST LOW 20 MIN: CPT | Performed by: PHYSICAL MEDICINE & REHABILITATION

## 2020-12-08 ASSESSMENT — MIFFLIN-ST. JEOR: SCORE: 1795.53

## 2020-12-08 NOTE — LETTER
12/8/2020         RE: Dominic Silva  [address]  [place] [address]        Dear Colleague,    Thank you for referring your patient, Dominic Silva, to the [facility] [place]. Please see a copy of my visit note below.    Sports Medicine Clinic Visit       PCP: Roland Kearns    Dominic Silva is a 70 year old male who is seen in follow up for right shoulder pain.  Since last visit on 6/15/2020, Dominic has minimal relief since his last injection. Pain is over the right shoulder.  He rates the pain at a 8/10 currently.  Symptoms are relieved with ibuprofen and rest.  Symptoms are worsened by overhead motions, reaching across his body, sleeping on the affected shoulder.  He recently saw Dr. Kearns who recommended another injection and formal physical therapy.       He is retired.       Review of Systems  Musculoskeletal: as above  Remainder of review of systems is negative including constitutional, eyes, ENT, CV, pulmonary, GI, , endocrine, skin, hematologic, and neurologic except as noted in HPI or medical history.    History reviewed. No pertinent past surgical/medical/family/social history other than as mentioned in HPI.    Patient Active Problem List   Diagnosis     HYPERLIPIDEMIA LDL GOAL <130     Advanced directives, counseling/discussion     PMR (polymyalgia rheumatica) (H)     Acquired elevated diaphragm     Cervical spondylosis without myelopathy     Past Medical History:   Diagnosis Date     Cervical herniated disc 3/2012    EDSI     Other and unspecified hyperlipidemia      Other symptoms referable to back     low back pain     Past Surgical History:   Procedure Laterality Date     COLONOSCOPY N/A 4/13/2015    Procedure: COLONOSCOPY;  Surgeon: Ajay Amador MD;  Location: [facility]     HC AMPUTATE FINGER/THUMB,SINGLE W/WO INTER TFR      2 fingers     HC COLONOSCOPY THRU STOMA, DIAGNOSTIC  2005    Normal-repeat 2015     Family History   Problem Relation Age of  Onset     Cardiovascular Father         pacemaker, CABG     Respiratory Father      Gastrointestinal Disease Father         ulcers several years ago     Alzheimer Disease Mother         undiagnosed--has symptoms     Lipids Mother      Eye Disorder Mother         cataract     Lipids Brother      Unknown/Adopted Brother      Myocardial Infarction Brother      Coronary Artery Disease Brother      Myocardial Infarction Sister      Coronary Artery Disease Sister      Unknown/Adopted Brother      Heart Disease Brother      Coronary Artery Disease Brother      Heart Disease Sister         pacemaker     Pacemaker Sister      C.A.D. 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, Hog and crop   Social Needs     Financial resource strain: Not on file     Food insecurity     Worry: Not on file     Inability: Not on file     Transportation needs     Medical: Not on file     Non-medical: Not on file   Tobacco Use     Smoking status: Never Smoker     Smokeless tobacco: Never Used   Substance and Sexual Activity     Alcohol use: Yes     Comment: occasional, twice a month     Drug use: No     Sexual activity: Yes     Partners: Female   Lifestyle     Physical activity     Days per week: Not on file     Minutes per session: Not on file     Stress: Not on file   Relationships     Social connections     Talks on phone: Not on file     Gets together: Not on file     Attends Spiritism service: Not on file     Active member of club or organization: Not on file     Attends meetings of clubs or organizations: Not on file     Relationship status: Not on file     Intimate partner violence      "Fear of current or ex partner: Not on file     Emotionally abused: Not on file     Physically abused: Not on file     Forced sexual activity: Not on file   Other Topics Concern      Service No     Blood Transfusions No     Caffeine Concern No     Comment: coffee 3 cups/day    Soda 2 cans/day     Occupational Exposure No     Comment: concrete dust/ready mix/ lifting, 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 Not Asked   Social History Narrative     Not on file         Current Outpatient Medications   Medication     ASPIRIN PO     IBUPROFEN PO     simvastatin (ZOCOR) 40 MG tablet     Current Facility-Administered Medications   Medication     triamcinolone (KENALOG-40) injection 40 mg     Allergies   Allergen Reactions     No Known Drug Allergies          Objective:  /74   Ht 1.805 m (5' 11.06\")   Wt 100.2 kg (221 lb)   BMI 30.77 kg/m      General: Alert and in no distress    Head: Normocephalic, atraumatic  Eyes: no scleral icterus or conjunctival erythema   Skin: no erythema, petechiae, or jaundice  Resp: normal respiratory effort without conversational dyspnea   Psych: normal mood and affect    Musculoskeletal:  -Tender over the right upper arm.  -Right shoulder ROM reduced compared to the left.    Radiology:  Independent visualization of images performed and reviewed with Dominic.    SHOULDER THREE VIEWS BILATERAL   6/15/2020 2:47 PM      HISTORY:  Bilateral shoulder pain.     COMPARISON: Left shoulder 2/18/2006                                                    "                   IMPRESSION:   1. Left: Moderate glenohumeral osteoarthritis, significantly  increased. There is a very small calcific focus adjacent to the  greater tuberosity which could represent minimal rotator cuff region  calcific tendinitis/bursitis  2. Right: Prominent glenohumeral osteoarthritis. There is marked joint  space narrowing inferiorly. There is some inferior translation of the  humeral head and humeral head remodeling.     KAYCE BUTTS MD    Assessment:  1. Chronic right shoulder pain    2. Primary osteoarthritis of right shoulder        Plan:  Discussed the assessment with the patient and developed a plan together:  -Right shoulder glenohumeral joint injection steroid injection ordered with radiology.  -Ice 15-20 minutes for pain relief or swelling as needed  -Patient's preferred over the counter medication as directed on packaging as needed for pain or soreness.    -Follow up as needed if symptoms fail to improve or worsen.  Please call with questions or concerns.        Prema Villaseñor MD, [facility] Sports Medicine  [facility]        Again, thank you for allowing me to participate in the care of your patient.        Sincerely,        Susy Villaseñor MD

## 2020-12-08 NOTE — PATIENT INSTRUCTIONS
-Right shoulder glenohumeral joint injection steroid injection ordered with radiology.  [facility] [phone]. Cost estimates can be provided by [facility] at the same number.  -Ice 15-20 minutes for pain relief or swelling as needed  -Patient's preferred over the counter medication as directed on packaging as needed for pain or soreness.    -Follow up as needed if symptoms fail to improve or worsen.  Please call with questions or concerns.

## 2020-12-09 ENCOUNTER — MEDICAL CORRESPONDENCE (OUTPATIENT)
Dept: HEALTH INFORMATION MANAGEMENT | Facility: CLINIC | Age: 70
End: 2020-12-09

## 2020-12-09 ENCOUNTER — TELEPHONE (OUTPATIENT)
Dept: ORTHOPEDICS | Facility: OTHER | Age: 70
End: 2020-12-09

## 2020-12-09 NOTE — TELEPHONE ENCOUNTER
Reason for call:  Other   Patient called regarding (reason for call): Patient was given a referral for an injection and is wondering if you can send that referral with insurance information to [facility] fax # [phone]    Additional comments: Please follow up with patient.    Phone number to reach patient:  Home number on file [phone] (home)    Best Time:  anytime    Can we leave a detailed message on this number?  YES    Travel screening: Not Applicable     Kwaku Hall on 12/9/2020 at 9:35 AM

## 2020-12-14 NOTE — TELEPHONE ENCOUNTER
Patient said they got your fax but sent something back to you for more information and they have not gotten that back from you yet    *a prior authorization?    Call to advise if you did get it and or if you did send it back    [phone]

## 2020-12-14 NOTE — TELEPHONE ENCOUNTER
Received a call from [facility] requesting a Prior Auth for the order. Would like a call back to [phone]

## 2020-12-15 ENCOUNTER — TELEPHONE (OUTPATIENT)
Dept: ORTHOPEDICS | Facility: OTHER | Age: 70
End: 2020-12-15

## 2020-12-15 NOTE — TELEPHONE ENCOUNTER
Current medication list sent to [facility]. Confirmed sent by [employer] to [phone] on 12/15/2020 at 2:58pm ([id number])    Ana Shah M.Ed., LAT, ATC  Clinic Coordinator for Dr. Prema Villaseñor

## 2020-12-15 NOTE — TELEPHONE ENCOUNTER
Spoke with radiology at [facility]. PA authorization was being sent to [place]. Will have them send to [place] for review.     Ana Shah M.Ed., LAT, ATC  Clinic Coordinator for Dr. Prema Villaseñor

## 2020-12-15 NOTE — TELEPHONE ENCOUNTER
Spoke with [facility] financial team. They would like [facility] to initate the PA.     I called and spoke with Radha in  [facility] Health Information and informed her of this. She indicated that she would initiate the PA request for the patient.     Ana Shah M.Ed., LAT, ATC  Clinic Coordinator for Dr. Prema Villaseñor

## 2020-12-15 NOTE — TELEPHONE ENCOUNTER
[facility] requesting a current medication list be faxed to them at fax# [phone]    Any questions call [phone]

## 2020-12-15 NOTE — TELEPHONE ENCOUNTER
Received an imcoming fax from [facility] asking us to complete the prior authorization and faxed it to [facility].     I reached out to financial services to see if they can assist with this. Awaiting call back from financial services.     Ana Shah M.Ed., LAT, ATC  Clinic Coordinator for Dr. Prema Villaseñor

## 2020-12-17 ENCOUNTER — TRANSFERRED RECORDS (OUTPATIENT)
Dept: HEALTH INFORMATION MANAGEMENT | Facility: CLINIC | Age: 70
End: 2020-12-17
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
## 2025-07-03 ENCOUNTER — MEDICAL CORRESPONDENCE (OUTPATIENT)
Dept: HEALTH INFORMATION MANAGEMENT | Facility: CLINIC | Age: 75
End: 2025-07-03
Payer: MEDICARE

## 2025-07-10 ENCOUNTER — TELEPHONE (OUTPATIENT)
Dept: NEUROLOGY | Facility: CLINIC | Age: 75
End: 2025-07-10
Payer: MEDICARE